# Patient Record
Sex: MALE | Race: WHITE | HISPANIC OR LATINO | URBAN - METROPOLITAN AREA
[De-identification: names, ages, dates, MRNs, and addresses within clinical notes are randomized per-mention and may not be internally consistent; named-entity substitution may affect disease eponyms.]

---

## 2017-10-05 ENCOUNTER — NEW PATIENT (OUTPATIENT)
Dept: URBAN - METROPOLITAN AREA CLINIC 10 | Facility: CLINIC | Age: 52
End: 2017-10-05
Payer: COMMERCIAL

## 2017-10-05 DIAGNOSIS — Z79.4 LONG TERM (CURRENT) USE OF INSULIN: ICD-10-CM

## 2017-10-05 PROCEDURE — 92004 COMPRE OPH EXAM NEW PT 1/>: CPT | Performed by: OPTOMETRIST

## 2017-10-05 PROCEDURE — 92250 FUNDUS PHOTOGRAPHY W/I&R: CPT | Performed by: OPTOMETRIST

## 2017-10-05 ASSESSMENT — INTRAOCULAR PRESSURE
OD: 14
OS: 13

## 2017-10-05 ASSESSMENT — VISUAL ACUITY
OS: 20/20
OD: 20/20

## 2017-10-05 ASSESSMENT — KERATOMETRY
OS: 45.13
OD: 45.25

## 2019-01-10 ENCOUNTER — FOLLOW UP ESTABLISHED (OUTPATIENT)
Dept: URBAN - METROPOLITAN AREA CLINIC 10 | Facility: CLINIC | Age: 54
End: 2019-01-10
Payer: COMMERCIAL

## 2019-01-10 PROCEDURE — 92014 COMPRE OPH EXAM EST PT 1/>: CPT | Performed by: OPTOMETRIST

## 2019-01-10 ASSESSMENT — VISUAL ACUITY
OS: 20/20
OD: 20/20

## 2019-01-10 ASSESSMENT — KERATOMETRY
OS: 45.13
OD: 45.25

## 2019-01-10 ASSESSMENT — INTRAOCULAR PRESSURE
OD: 17
OS: 15

## 2019-03-20 ENCOUNTER — OFFICE VISIT (OUTPATIENT)
Dept: FAMILY MEDICINE CLINIC | Facility: CLINIC | Age: 54
End: 2019-03-20
Payer: COMMERCIAL

## 2019-03-20 ENCOUNTER — LAB ENCOUNTER (OUTPATIENT)
Dept: LAB | Age: 54
End: 2019-03-20
Attending: FAMILY MEDICINE
Payer: COMMERCIAL

## 2019-03-20 VITALS
WEIGHT: 207.38 LBS | HEIGHT: 66.1 IN | BODY MASS INDEX: 33.33 KG/M2 | DIASTOLIC BLOOD PRESSURE: 91 MMHG | TEMPERATURE: 98 F | HEART RATE: 91 BPM | SYSTOLIC BLOOD PRESSURE: 158 MMHG

## 2019-03-20 DIAGNOSIS — Z00.00 PHYSICAL EXAM: ICD-10-CM

## 2019-03-20 DIAGNOSIS — H53.8 BLURRED VISION: ICD-10-CM

## 2019-03-20 DIAGNOSIS — Z12.11 COLON CANCER SCREENING: ICD-10-CM

## 2019-03-20 DIAGNOSIS — Z00.00 PHYSICAL EXAM: Primary | ICD-10-CM

## 2019-03-20 DIAGNOSIS — H11.003 PTERYGIUM OF BOTH EYES: ICD-10-CM

## 2019-03-20 DIAGNOSIS — K42.9 UMBILICAL HERNIA WITHOUT OBSTRUCTION AND WITHOUT GANGRENE: ICD-10-CM

## 2019-03-20 DIAGNOSIS — E66.09 CLASS 1 OBESITY DUE TO EXCESS CALORIES WITHOUT SERIOUS COMORBIDITY WITH BODY MASS INDEX (BMI) OF 33.0 TO 33.9 IN ADULT: ICD-10-CM

## 2019-03-20 LAB
ALBUMIN SERPL-MCNC: 4 G/DL (ref 3.4–5)
ALBUMIN/GLOB SERPL: 1.2 {RATIO} (ref 1–2)
ALP LIVER SERPL-CCNC: 73 U/L (ref 45–117)
ALT SERPL-CCNC: 50 U/L (ref 16–61)
ANION GAP SERPL CALC-SCNC: 5 MMOL/L (ref 0–18)
AST SERPL-CCNC: 23 U/L (ref 15–37)
BASOPHILS # BLD AUTO: 0.05 X10(3) UL (ref 0–0.2)
BASOPHILS NFR BLD AUTO: 0.6 %
BILIRUB SERPL-MCNC: 0.3 MG/DL (ref 0.1–2)
BILIRUB UR QL: NEGATIVE
BUN BLD-MCNC: 16 MG/DL (ref 7–18)
BUN/CREAT SERPL: 19 (ref 10–20)
CALCIUM BLD-MCNC: 9.2 MG/DL (ref 8.5–10.1)
CHLORIDE SERPL-SCNC: 106 MMOL/L (ref 98–107)
CHOLEST SMN-MCNC: 184 MG/DL (ref ?–200)
CLARITY UR: CLEAR
CO2 SERPL-SCNC: 29 MMOL/L (ref 21–32)
COLOR UR: YELLOW
CREAT BLD-MCNC: 0.84 MG/DL (ref 0.7–1.3)
DEPRECATED RDW RBC AUTO: 43.8 FL (ref 35.1–46.3)
EOSINOPHIL # BLD AUTO: 0.15 X10(3) UL (ref 0–0.7)
EOSINOPHIL NFR BLD AUTO: 1.7 %
ERYTHROCYTE [DISTWIDTH] IN BLOOD BY AUTOMATED COUNT: 13.7 % (ref 11–15)
EST. AVERAGE GLUCOSE BLD GHB EST-MCNC: 117 MG/DL (ref 68–126)
GLOBULIN PLAS-MCNC: 3.4 G/DL (ref 2.8–4.4)
GLUCOSE BLD-MCNC: 107 MG/DL (ref 70–99)
GLUCOSE UR-MCNC: NEGATIVE MG/DL
HBA1C MFR BLD HPLC: 5.7 % (ref ?–5.7)
HCT VFR BLD AUTO: 51.3 % (ref 39–53)
HDLC SERPL-MCNC: 34 MG/DL (ref 40–59)
HGB BLD-MCNC: 17.1 G/DL (ref 13–17.5)
HGB UR QL STRIP.AUTO: NEGATIVE
IMM GRANULOCYTES # BLD AUTO: 0.06 X10(3) UL (ref 0–1)
IMM GRANULOCYTES NFR BLD: 0.7 %
KETONES UR-MCNC: NEGATIVE MG/DL
LDLC SERPL CALC-MCNC: 123 MG/DL (ref ?–100)
LYMPHOCYTES # BLD AUTO: 2.6 X10(3) UL (ref 1–4)
LYMPHOCYTES NFR BLD AUTO: 29.5 %
M PROTEIN MFR SERPL ELPH: 7.4 G/DL (ref 6.4–8.2)
MCH RBC QN AUTO: 29.3 PG (ref 26–34)
MCHC RBC AUTO-ENTMCNC: 33.3 G/DL (ref 31–37)
MCV RBC AUTO: 87.8 FL (ref 80–100)
MONOCYTES # BLD AUTO: 0.78 X10(3) UL (ref 0.1–1)
MONOCYTES NFR BLD AUTO: 8.8 %
NEUTROPHILS # BLD AUTO: 5.18 X10 (3) UL (ref 1.5–7.7)
NEUTROPHILS # BLD AUTO: 5.18 X10(3) UL (ref 1.5–7.7)
NEUTROPHILS NFR BLD AUTO: 58.7 %
NITRITE UR QL STRIP.AUTO: NEGATIVE
NONHDLC SERPL-MCNC: 150 MG/DL (ref ?–130)
OSMOLALITY SERPL CALC.SUM OF ELEC: 292 MOSM/KG (ref 275–295)
PH UR: 5 [PH] (ref 5–8)
PLATELET # BLD AUTO: 281 10(3)UL (ref 150–450)
POTASSIUM SERPL-SCNC: 3.7 MMOL/L (ref 3.5–5.1)
PROT UR-MCNC: NEGATIVE MG/DL
PSA SERPL-MCNC: 0.89 NG/ML (ref ?–4)
RBC # BLD AUTO: 5.84 X10(6)UL (ref 4.3–5.7)
RBC #/AREA URNS AUTO: 1 /HPF
RBC #/AREA URNS AUTO: 1 /HPF
SODIUM SERPL-SCNC: 140 MMOL/L (ref 136–145)
SP GR UR STRIP: 1.03 (ref 1–1.03)
TRIGL SERPL-MCNC: 135 MG/DL (ref 30–149)
TSI SER-ACNC: 3.28 MIU/ML (ref 0.36–3.74)
UROBILINOGEN UR STRIP-ACNC: <2
VIT C UR-MCNC: NEGATIVE MG/DL
VLDLC SERPL CALC-MCNC: 27 MG/DL (ref 0–30)
WBC # BLD AUTO: 8.8 X10(3) UL (ref 4–11)
WBC #/AREA URNS AUTO: 5 /HPF
WBC #/AREA URNS AUTO: 5 /HPF

## 2019-03-20 PROCEDURE — 99386 PREV VISIT NEW AGE 40-64: CPT | Performed by: FAMILY MEDICINE

## 2019-03-20 PROCEDURE — 93010 ELECTROCARDIOGRAM REPORT: CPT | Performed by: FAMILY MEDICINE

## 2019-03-20 PROCEDURE — 84443 ASSAY THYROID STIM HORMONE: CPT

## 2019-03-20 PROCEDURE — 36415 COLL VENOUS BLD VENIPUNCTURE: CPT

## 2019-03-20 PROCEDURE — 83036 HEMOGLOBIN GLYCOSYLATED A1C: CPT

## 2019-03-20 PROCEDURE — 80061 LIPID PANEL: CPT

## 2019-03-20 PROCEDURE — 81001 URINALYSIS AUTO W/SCOPE: CPT | Performed by: FAMILY MEDICINE

## 2019-03-20 PROCEDURE — 90471 IMMUNIZATION ADMIN: CPT | Performed by: FAMILY MEDICINE

## 2019-03-20 PROCEDURE — 90715 TDAP VACCINE 7 YRS/> IM: CPT | Performed by: FAMILY MEDICINE

## 2019-03-20 PROCEDURE — 84153 ASSAY OF PSA TOTAL: CPT | Performed by: FAMILY MEDICINE

## 2019-03-20 PROCEDURE — 80053 COMPREHEN METABOLIC PANEL: CPT

## 2019-03-20 PROCEDURE — 82306 VITAMIN D 25 HYDROXY: CPT | Performed by: FAMILY MEDICINE

## 2019-03-20 PROCEDURE — 81015 MICROSCOPIC EXAM OF URINE: CPT | Performed by: FAMILY MEDICINE

## 2019-03-20 PROCEDURE — 93005 ELECTROCARDIOGRAM TRACING: CPT

## 2019-03-20 PROCEDURE — 85025 COMPLETE CBC W/AUTO DIFF WBC: CPT

## 2019-03-20 NOTE — PROGRESS NOTES
3/20/2019  8:51 AM    Tonny Chan is a 62year old male. Chief complaint(s): Patient presents with:  New Patient  Physical    HPI:     Tonny Chan primary complaint is regarding CPE.      Tonny Chan is a 62year old male is here for routi pain, hearing loss, nosebleeds and tinnitus. Eyes: Positive for redness and visual disturbance. Negative for pain. Respiratory: Negative for apnea, cough, chest tightness, shortness of breath and wheezing.     Cardiovascular: Negative for chest pain, p heard.  Pulmonary/Chest:   Lungs clear to ascultation bilaterally. Abdominal: Soft. Normal appearance and bowel sounds are normal. He exhibits no distension and no mass. There is no splenomegaly or hepatomegaly. There is no tenderness.  A hernia is presen Routine [E]      Urine Microscopic w Reflex CULTURE      Vitamin D, 25-Hydroxy [E]      TETANUS, DIPHTHERIA TOXOIDS AND ACELLULAR PERTUSIS VACCINE (TDAP), >7 YEARS, IM USE     In-House; Urine dip. Test/Procedures done today include: EKG.     IMMUNIZATIONS: Referrals:  TETANUS, DIPHTHERIA TOXOIDS AND ACELLULAR PERTUSIS VACCINE (TDAP), >7 YEARS, IM USE  GASTRO - INTERNAL  OPHTHALMOLOGY - INTERNAL  EKG 12-LEAD         Niesha Del Castillo MD

## 2019-03-22 LAB — 25(OH)D3 SERPL-MCNC: 13.8 NG/ML (ref 30–100)

## 2019-03-22 RX ORDER — ERGOCALCIFEROL 1.25 MG/1
50000 CAPSULE ORAL WEEKLY
Qty: 12 CAPSULE | Refills: 4 | Status: SHIPPED | OUTPATIENT
Start: 2019-03-22 | End: 2019-04-21

## 2019-07-10 ENCOUNTER — OFFICE VISIT (OUTPATIENT)
Dept: GASTROENTEROLOGY | Facility: CLINIC | Age: 54
End: 2019-07-10
Payer: COMMERCIAL

## 2019-07-10 VITALS
BODY MASS INDEX: 34.72 KG/M2 | WEIGHT: 216 LBS | DIASTOLIC BLOOD PRESSURE: 88 MMHG | HEART RATE: 83 BPM | HEIGHT: 66 IN | SYSTOLIC BLOOD PRESSURE: 152 MMHG

## 2019-07-10 DIAGNOSIS — Z12.11 ENCOUNTER FOR SCREENING COLONOSCOPY: Primary | ICD-10-CM

## 2019-07-10 PROCEDURE — 99243 OFF/OP CNSLTJ NEW/EST LOW 30: CPT | Performed by: PHYSICIAN ASSISTANT

## 2019-07-10 RX ORDER — POLYETHYLENE GLYCOL 3350, SODIUM CHLORIDE, SODIUM BICARBONATE, POTASSIUM CHLORIDE 420; 11.2; 5.72; 1.48 G/4L; G/4L; G/4L; G/4L
POWDER, FOR SOLUTION ORAL
Qty: 1 BOTTLE | Refills: 0 | Status: SHIPPED | OUTPATIENT
Start: 2019-07-10 | End: 2019-10-07

## 2019-07-10 NOTE — H&P
[address] [facility] [address] Gastroenterology                                                                                                  Clinic History and Physical     Pa Smokeless tobacco: Never Used      Tobacco comment: exposure to cigarettes by roomate    Alcohol use:  Yes      Alcohol/week: 3.0 oz      Types: 5 Cans of beer per week      Frequency: 2-3 times a week      Drinks per session: 5 or 6    Drug use: No      Co use, former tobacco use, who presents for colon cancer screening evaluation. No prior colonoscopy. No anemia noted on most recent labs. Patient presents today without GI complaints and feels otherwise well.  Language Line (SK#[id number] Cole Acosta) utilzed during patient signed informed consent and elected to proceed with colonoscopy with intervention [i.e. polypectomy, stent placement, etc.] as indicated. Orders This Visit:  No orders of the defined types were placed in this encounter.       Meds This Visit:

## 2019-07-10 NOTE — PATIENT INSTRUCTIONS
1. Schedule colonoscopy with Dr. Tonia Rosenberg with MAC @ [address] [place] [address] or [facility]. 2.  bowel prep from pharmacy - I have prescribed Trilyte split dose preparation    3.  Medication Changes:    ** If MAC @ [facility]/NE:    - HOLD ACE/ARBs the night before

## 2019-07-23 ENCOUNTER — TELEPHONE (OUTPATIENT)
Dept: GASTROENTEROLOGY | Facility: CLINIC | Age: 54
End: 2019-07-23

## 2019-07-23 DIAGNOSIS — Z12.11 COLON CANCER SCREENING: Primary | ICD-10-CM

## 2019-07-23 NOTE — TELEPHONE ENCOUNTER
Damian Gramajo routed this conversation to [facility] Gi Clinical Staff         7/22/19 5:28 PM      Low Kline (Emergency Contact) contacted Earle Alaniz       7/22/19 5:28 PM   Note      Calling to schedule CLN

## 2019-07-23 NOTE — TELEPHONE ENCOUNTER
Routed to GI schedulers- please schedule per PB OV notes from 7/10/19, thank you.     Per PB:  \"Recommend:  -Schedule colonoscopy w/ Dr. Darlene Delgado or Yuan Osborn with MAC anesthesia @ [address] [place] [address] or [facility]   -Prep: Trilyte split dose preparation   -Medicatio

## 2019-08-20 NOTE — TELEPHONE ENCOUNTER
Scheduled for:  Colonoscopy 02609  Provider Name: Dr. Allie Ceja  Date:  9/25/19  Location:  [facility]  Sedation:  MAC  Time:   5511 (pt is aware to arrive at 1015)   Prep:  Trilyte, sent via Mychart  Meds/Allergies Reconciled?:  Physician reviewed   Diagnosis with c

## 2019-08-20 NOTE — TELEPHONE ENCOUNTER
Pt spouse called to get pt scheduled.  Attempt to transfer (10 rings) no answer please call [phone] thank you

## 2019-08-21 ENCOUNTER — TELEPHONE (OUTPATIENT)
Dept: GASTROENTEROLOGY | Facility: CLINIC | Age: 54
End: 2019-08-21

## 2019-08-21 DIAGNOSIS — Z12.11 COLON CANCER SCREENING: Primary | ICD-10-CM

## 2019-08-21 NOTE — TELEPHONE ENCOUNTER
Rescheduled for:  Colonoscopy [address]1081  Provider Name: Dr. Carlos Alfaro  Date:    From-9/25/19  To-10/8/19  Location:  [facility]  Sedation:  MAC  Time:    From-1115   GB-[id number] (pt is aware to arrive at 1345)   Prep:  Trilyte, sent new instructions via [email or web address].mes/Allergi

## 2019-10-07 ENCOUNTER — TELEPHONE (OUTPATIENT)
Dept: GASTROENTEROLOGY | Facility: CLINIC | Age: 54
End: 2019-10-07

## 2019-10-07 RX ORDER — POLYETHYLENE GLYCOL 3350, SODIUM CHLORIDE, SODIUM BICARBONATE, POTASSIUM CHLORIDE 420; 11.2; 5.72; 1.48 G/4L; G/4L; G/4L; G/4L
POWDER, FOR SOLUTION ORAL
Qty: 1 BOTTLE | Refills: 0 | Status: ON HOLD | OUTPATIENT
Start: 2019-10-07 | End: 2019-10-08

## 2019-10-07 NOTE — TELEPHONE ENCOUNTER
Pt wife called and states she is at the pharmacy to  prep but pharmacy does not take her insurance n pt Is scheduled for tomorrow and would like to transfer to different pharmacy [facility] in [address] .  Please call

## 2019-10-07 NOTE — TELEPHONE ENCOUNTER
Spoke to pt wife, Angel Orourke ([facility] per [employer]) and reviewed that [facility] is supposed to call the [facility] and transfer the RX on their behalf. She states she already tried that but is still having trouble w/ [facility].  I re-sent RX to correct location and reviewed

## 2019-10-08 ENCOUNTER — ANESTHESIA EVENT (OUTPATIENT)
Dept: ENDOSCOPY | Facility: HOSPITAL | Age: 54
End: 2019-10-08
Payer: COMMERCIAL

## 2019-10-08 ENCOUNTER — ANESTHESIA (OUTPATIENT)
Dept: ENDOSCOPY | Facility: HOSPITAL | Age: 54
End: 2019-10-08
Payer: COMMERCIAL

## 2019-10-08 ENCOUNTER — HOSPITAL ENCOUNTER (OUTPATIENT)
Facility: HOSPITAL | Age: 54
Setting detail: HOSPITAL OUTPATIENT SURGERY
Discharge: HOME | End: 2019-10-08
Attending: INTERNAL MEDICINE | Admitting: INTERNAL MEDICINE
Payer: COMMERCIAL

## 2019-10-08 DIAGNOSIS — Z12.11 COLON CANCER SCREENING: ICD-10-CM

## 2019-10-08 PROCEDURE — 0DBN8ZX EXCISION OF SIGMOID COLON, VIA NATURAL OR ARTIFICIAL OPENING ENDOSCOPIC, DIAGNOSTIC: ICD-10-PCS | Performed by: NURSE ANESTHETIST, CERTIFIED REGISTERED

## 2019-10-08 PROCEDURE — 0DBK8ZX EXCISION OF ASCENDING COLON, VIA NATURAL OR ARTIFICIAL OPENING ENDOSCOPIC, DIAGNOSTIC: ICD-10-PCS | Performed by: NURSE ANESTHETIST, CERTIFIED REGISTERED

## 2019-10-08 PROCEDURE — 45385 COLONOSCOPY W/LESION REMOVAL: CPT | Performed by: INTERNAL MEDICINE

## 2019-10-08 RX ORDER — LIDOCAINE HYDROCHLORIDE 10 MG/ML
INJECTION, SOLUTION EPIDURAL; INFILTRATION; INTRACAUDAL; PERINEURAL AS NEEDED
Status: DISCONTINUED | OUTPATIENT
Start: 2019-10-08 | End: 2019-10-08 | Stop reason: SURG

## 2019-10-08 RX ORDER — SODIUM CHLORIDE, SODIUM LACTATE, POTASSIUM CHLORIDE, CALCIUM CHLORIDE 600; 310; 30; 20 MG/100ML; MG/100ML; MG/100ML; MG/100ML
INJECTION, SOLUTION INTRAVENOUS CONTINUOUS
Status: DISCONTINUED | OUTPATIENT
Start: 2019-10-08 | End: 2019-10-08

## 2019-10-08 RX ADMIN — SODIUM CHLORIDE, SODIUM LACTATE, POTASSIUM CHLORIDE, CALCIUM CHLORIDE: 600; 310; 30; 20 INJECTION, SOLUTION INTRAVENOUS at 15:22:00

## 2019-10-08 RX ADMIN — LIDOCAINE HYDROCHLORIDE 50 MG: 10 INJECTION, SOLUTION EPIDURAL; INFILTRATION; INTRACAUDAL; PERINEURAL at 14:50:00

## 2019-10-08 NOTE — OPERATIVE REPORT
COLONOSCOPY REPORT    Juan Pacheco     1965 Age 62year old   PCP Trina Lira MD Endoscopist Juan Carlos Cook MD     Date of procedure: 10/08/19    Procedure: Colonoscopy w/cold snare polypectomy    Pre-operative diagnosis: Screening polypectomy and retrieved. Persistent oozing from polypectomy site controlled with a single endoclip placement. 2. Diverticulosis: none.     3. Terminal ileum: the visualized mucosa appeared normal.    4. A retroflexed view of the rectum revealed small

## 2019-10-08 NOTE — ANESTHESIA POSTPROCEDURE EVALUATION
Patient: Shavon Preciado    Procedure Summary     Date:  10/08/19 Room / Location:  [address] [place] [address] ENDOSCOPY 01 / [address] [place] [address] ENDOSCOPY    Anesthesia Start:  1658 Anesthesia Stop:  4682    Procedure:  COLONOSCOPY (N/A ) Diagnosis:       Colon cancer screening      ([email or web address]

## 2019-10-08 NOTE — H&P
History & Physical Examination    Patient Name: Jonas Ocampo  MRN: [id number]  [facility]: [id number]  YOB: 1965    Diagnosis: screening for colon cancer      Medications Prior to Admission:  [DISCONTINUED] PEG 3350-KCl-Na Bicarb-NaCl (Macrina Naylor) above.    [facility] [address] [place] [address] - Gastroenterology  10/8/2019  3:17 PM

## 2019-10-08 NOTE — ANESTHESIA PREPROCEDURE EVALUATION
Anesthesia PreOp Note    HPI:     Juan Pacheco is a 62year old male who presents for preoperative consultation requested by: Colonel Rhea MD    Date of Surgery: 10/8/2019    Procedure(s):  COLONOSCOPY  Indication: Colon cancer screening    Gricelda Quiros Tobacco comment: exposure to cigarettes by roomate    Substance and Sexual Activity      Alcohol use:  Yes        Alcohol/week: 5.0 standard drinks        Types: 5 Cans of beer per week        Frequency: 2-3 times a week        Drinks per session: 5 or (+) obese,                Anesthesia Plan:   ASA:  2  Plan:   MAC  Post-op Pain Management: IV analgesics  Informed Consent Plan and Risks Discussed With:  Patient and significant other  Discussed plan with:  Surgeon      I have informed Christian hartley

## 2019-10-09 VITALS
WEIGHT: 209 LBS | DIASTOLIC BLOOD PRESSURE: 80 MMHG | SYSTOLIC BLOOD PRESSURE: 125 MMHG | RESPIRATION RATE: 16 BRPM | HEART RATE: 81 BPM | HEIGHT: 66 IN | BODY MASS INDEX: 33.59 KG/M2 | OXYGEN SATURATION: 97 %

## 2019-10-23 ENCOUNTER — TELEPHONE (OUTPATIENT)
Dept: GASTROENTEROLOGY | Facility: CLINIC | Age: 54
End: 2019-10-23

## 2019-10-23 NOTE — TELEPHONE ENCOUNTER
I mailed out colonoscopy results letter to pt  Updated health maintenance  Entered into 3 yr CLN recall  Recall colon in 3 years per.  Colon done 10/08/19    GI staff: please place recall for colonoscopy in 3 years

## 2020-03-13 ENCOUNTER — FOLLOW UP ESTABLISHED (OUTPATIENT)
Dept: URBAN - METROPOLITAN AREA CLINIC 10 | Facility: CLINIC | Age: 55
End: 2020-03-13
Payer: COMMERCIAL

## 2020-03-13 DIAGNOSIS — E11.9 TYPE 2 DIABETES MELLITUS WITHOUT COMPLICATIONS: Primary | ICD-10-CM

## 2020-03-13 DIAGNOSIS — Z79.84 LONG TERM (CURRENT) USE OF ORAL ANTIDIABETIC DRUGS: ICD-10-CM

## 2020-03-13 PROCEDURE — 92014 COMPRE OPH EXAM EST PT 1/>: CPT | Performed by: OPTOMETRIST

## 2020-03-13 PROCEDURE — 92134 CPTRZ OPH DX IMG PST SGM RTA: CPT | Performed by: OPTOMETRIST

## 2020-03-13 ASSESSMENT — INTRAOCULAR PRESSURE
OS: 15
OD: 14

## 2020-03-13 ASSESSMENT — VISUAL ACUITY
OD: 20/20
OS: 20/25

## 2020-03-13 ASSESSMENT — KERATOMETRY
OS: 45.00
OD: 45.01

## 2021-02-17 ENCOUNTER — LAB ENCOUNTER (OUTPATIENT)
Dept: LAB | Age: 56
End: 2021-02-17
Attending: FAMILY MEDICINE
Payer: COMMERCIAL

## 2021-02-17 ENCOUNTER — OFFICE VISIT (OUTPATIENT)
Dept: FAMILY MEDICINE CLINIC | Facility: CLINIC | Age: 56
End: 2021-02-17
Payer: COMMERCIAL

## 2021-02-17 VITALS
DIASTOLIC BLOOD PRESSURE: 102 MMHG | SYSTOLIC BLOOD PRESSURE: 175 MMHG | TEMPERATURE: 98 F | HEART RATE: 80 BPM | BODY MASS INDEX: 33.88 KG/M2 | HEIGHT: 66 IN | WEIGHT: 210.81 LBS

## 2021-02-17 DIAGNOSIS — I10 ESSENTIAL HYPERTENSION: ICD-10-CM

## 2021-02-17 DIAGNOSIS — E66.09 CLASS 1 OBESITY DUE TO EXCESS CALORIES WITH SERIOUS COMORBIDITY AND BODY MASS INDEX (BMI) OF 34.0 TO 34.9 IN ADULT: ICD-10-CM

## 2021-02-17 DIAGNOSIS — L30.9 ECZEMA OF BOTH HANDS: ICD-10-CM

## 2021-02-17 DIAGNOSIS — Z00.00 PHYSICAL EXAM: ICD-10-CM

## 2021-02-17 DIAGNOSIS — Z00.00 PHYSICAL EXAM: Primary | ICD-10-CM

## 2021-02-17 LAB
ALBUMIN SERPL-MCNC: 4 G/DL (ref 3.4–5)
ALBUMIN/GLOB SERPL: 1.1 {RATIO} (ref 1–2)
ALP LIVER SERPL-CCNC: 81 U/L
ALT SERPL-CCNC: 54 U/L
ANION GAP SERPL CALC-SCNC: 3 MMOL/L (ref 0–18)
AST SERPL-CCNC: 24 U/L (ref 15–37)
BACTERIA UR QL AUTO: NEGATIVE /HPF
BASOPHILS # BLD AUTO: 0.05 X10(3) UL (ref 0–0.2)
BASOPHILS NFR BLD AUTO: 0.6 %
BILIRUB SERPL-MCNC: 0.6 MG/DL (ref 0.1–2)
BILIRUB UR QL: NEGATIVE
BUN BLD-MCNC: 17 MG/DL (ref 7–18)
BUN/CREAT SERPL: 18.1 (ref 10–20)
CALCIUM BLD-MCNC: 9.1 MG/DL (ref 8.5–10.1)
CHLORIDE SERPL-SCNC: 107 MMOL/L (ref 98–112)
CHOLEST SMN-MCNC: 176 MG/DL (ref ?–200)
CLARITY UR: CLEAR
CO2 SERPL-SCNC: 34 MMOL/L (ref 21–32)
COLOR UR: YELLOW
CREAT BLD-MCNC: 0.94 MG/DL
DEPRECATED RDW RBC AUTO: 43.3 FL (ref 35.1–46.3)
EOSINOPHIL # BLD AUTO: 0.13 X10(3) UL (ref 0–0.7)
EOSINOPHIL NFR BLD AUTO: 1.5 %
ERYTHROCYTE [DISTWIDTH] IN BLOOD BY AUTOMATED COUNT: 13.4 % (ref 11–15)
EST. AVERAGE GLUCOSE BLD GHB EST-MCNC: 114 MG/DL (ref 68–126)
GLOBULIN PLAS-MCNC: 3.6 G/DL (ref 2.8–4.4)
GLUCOSE BLD-MCNC: 122 MG/DL (ref 70–99)
GLUCOSE UR-MCNC: NEGATIVE MG/DL
HBA1C MFR BLD HPLC: 5.6 % (ref ?–5.7)
HCT VFR BLD AUTO: 50.6 %
HDLC SERPL-MCNC: 40 MG/DL (ref 40–59)
HGB BLD-MCNC: 16.8 G/DL
HGB UR QL STRIP.AUTO: NEGATIVE
IMM GRANULOCYTES # BLD AUTO: 0.03 X10(3) UL (ref 0–1)
IMM GRANULOCYTES NFR BLD: 0.3 %
KETONES UR-MCNC: NEGATIVE MG/DL
LDLC SERPL CALC-MCNC: 114 MG/DL (ref ?–100)
LEUKOCYTE ESTERASE UR QL STRIP.AUTO: NEGATIVE
LYMPHOCYTES # BLD AUTO: 2.53 X10(3) UL (ref 1–4)
LYMPHOCYTES NFR BLD AUTO: 29.1 %
M PROTEIN MFR SERPL ELPH: 7.6 G/DL (ref 6.4–8.2)
MCH RBC QN AUTO: 29.4 PG (ref 26–34)
MCHC RBC AUTO-ENTMCNC: 33.2 G/DL (ref 31–37)
MCV RBC AUTO: 88.5 FL
MONOCYTES # BLD AUTO: 0.72 X10(3) UL (ref 0.1–1)
MONOCYTES NFR BLD AUTO: 8.3 %
NEUTROPHILS # BLD AUTO: 5.24 X10 (3) UL (ref 1.5–7.7)
NEUTROPHILS # BLD AUTO: 5.24 X10(3) UL (ref 1.5–7.7)
NEUTROPHILS NFR BLD AUTO: 60.2 %
NITRITE UR QL STRIP.AUTO: NEGATIVE
NONHDLC SERPL-MCNC: 136 MG/DL (ref ?–130)
OSMOLALITY SERPL CALC.SUM OF ELEC: 301 MOSM/KG (ref 275–295)
PATIENT FASTING Y/N/NP: YES
PATIENT FASTING Y/N/NP: YES
PH UR: 5 [PH] (ref 5–8)
PLATELET # BLD AUTO: 277 10(3)UL (ref 150–450)
POTASSIUM SERPL-SCNC: 4.3 MMOL/L (ref 3.5–5.1)
PROT UR-MCNC: NEGATIVE MG/DL
PSA SERPL-MCNC: 0.99 NG/ML (ref ?–4)
RBC # BLD AUTO: 5.72 X10(6)UL
RBC #/AREA URNS AUTO: 0 /HPF
SODIUM SERPL-SCNC: 144 MMOL/L (ref 136–145)
SP GR UR STRIP: 1.03 (ref 1–1.03)
TRIGL SERPL-MCNC: 109 MG/DL (ref 30–149)
TSI SER-ACNC: 2.96 MIU/ML (ref 0.36–3.74)
UROBILINOGEN UR STRIP-ACNC: <2
VLDLC SERPL CALC-MCNC: 22 MG/DL (ref 0–30)
WBC # BLD AUTO: 8.7 X10(3) UL (ref 4–11)
WBC #/AREA URNS AUTO: <1 /HPF

## 2021-02-17 PROCEDURE — 82306 VITAMIN D 25 HYDROXY: CPT | Performed by: FAMILY MEDICINE

## 2021-02-17 PROCEDURE — 84443 ASSAY THYROID STIM HORMONE: CPT

## 2021-02-17 PROCEDURE — 84153 ASSAY OF PSA TOTAL: CPT | Performed by: FAMILY MEDICINE

## 2021-02-17 PROCEDURE — 36415 COLL VENOUS BLD VENIPUNCTURE: CPT | Performed by: FAMILY MEDICINE

## 2021-02-17 PROCEDURE — 80061 LIPID PANEL: CPT

## 2021-02-17 PROCEDURE — 85025 COMPLETE CBC W/AUTO DIFF WBC: CPT

## 2021-02-17 PROCEDURE — 3008F BODY MASS INDEX DOCD: CPT | Performed by: FAMILY MEDICINE

## 2021-02-17 PROCEDURE — 80053 COMPREHEN METABOLIC PANEL: CPT

## 2021-02-17 PROCEDURE — 3077F SYST BP >= 140 MM HG: CPT | Performed by: FAMILY MEDICINE

## 2021-02-17 PROCEDURE — 83036 HEMOGLOBIN GLYCOSYLATED A1C: CPT

## 2021-02-17 PROCEDURE — 99396 PREV VISIT EST AGE 40-64: CPT | Performed by: FAMILY MEDICINE

## 2021-02-17 PROCEDURE — 81015 MICROSCOPIC EXAM OF URINE: CPT | Performed by: FAMILY MEDICINE

## 2021-02-17 PROCEDURE — 81001 URINALYSIS AUTO W/SCOPE: CPT | Performed by: FAMILY MEDICINE

## 2021-02-17 PROCEDURE — 3080F DIAST BP >= 90 MM HG: CPT | Performed by: FAMILY MEDICINE

## 2021-02-17 PROCEDURE — 99213 OFFICE O/P EST LOW 20 MIN: CPT | Performed by: FAMILY MEDICINE

## 2021-02-17 RX ORDER — LISINOPRIL 20 MG/1
20 TABLET ORAL DAILY
Qty: 30 TABLET | Refills: 2 | Status: SHIPPED | OUTPATIENT
Start: 2021-02-17 | End: 2021-03-10

## 2021-02-17 NOTE — PROGRESS NOTES
2/17/2021  11:59 AM    Kimber Merritt is a 60year old male. Chief complaint(s): Patient presents with:  Physical    HPI:     Kimber Merritt primary complaint is regarding CPE.        Kimber Merritt is a 60year old male is here for routine periodi Take 1 tablet (20 mg total) by mouth daily. 30 tablet 2   • triamcinolone acetonide 0.1 % External Cream Apply topically 2 (two) times daily as needed.  60 g 1       Allergies:  No Known Allergies      ROS:   Review of Systems   Constitutional: Positive for appearance and bowel sounds are normal. He exhibits no distension and no mass. There is no splenomegaly or hepatomegaly. There is no abdominal tenderness.  Hernia confirmed negative in the ventral area, confirmed negative in the right inguinal area and conf helmets, sunscreen, protective sports gear ), nutrition (i.e. healthy meals and snacks (i.e. avoid junk food and high-carbohydrate foods); athletic conditioning, fluids; low fat milk, limit to less than 20 oz. a day; dental care ), and Healthy habits& Soci follow-up visit in 3 weeks. 4. Eczema of both hands    MEDICATIONS:     • triamcinolone acetonide 0.1 % External Cream 60 g 1     Sig: Apply topically 2 (two) times daily as needed.        RECOMMENDATIONS given include: Patient was reassured of  his

## 2021-02-19 LAB — 25(OH)D3 SERPL-MCNC: 12.3 NG/ML (ref 30–100)

## 2021-02-21 RX ORDER — ERGOCALCIFEROL 1.25 MG/1
50000 CAPSULE ORAL WEEKLY
Qty: 12 CAPSULE | Refills: 4 | Status: SHIPPED | OUTPATIENT
Start: 2021-02-21 | End: 2021-03-23

## 2021-03-09 DIAGNOSIS — Z23 NEED FOR VACCINATION: ICD-10-CM

## 2021-03-10 ENCOUNTER — OFFICE VISIT (OUTPATIENT)
Dept: FAMILY MEDICINE CLINIC | Facility: CLINIC | Age: 56
End: 2021-03-10
Payer: COMMERCIAL

## 2021-03-10 VITALS
SYSTOLIC BLOOD PRESSURE: 133 MMHG | BODY MASS INDEX: 33.75 KG/M2 | WEIGHT: 210 LBS | HEIGHT: 66 IN | HEART RATE: 74 BPM | DIASTOLIC BLOOD PRESSURE: 77 MMHG

## 2021-03-10 DIAGNOSIS — I10 ESSENTIAL HYPERTENSION: Primary | ICD-10-CM

## 2021-03-10 DIAGNOSIS — E55.9 VITAMIN D DEFICIENCY: ICD-10-CM

## 2021-03-10 PROCEDURE — 3078F DIAST BP <80 MM HG: CPT | Performed by: FAMILY MEDICINE

## 2021-03-10 PROCEDURE — 99213 OFFICE O/P EST LOW 20 MIN: CPT | Performed by: FAMILY MEDICINE

## 2021-03-10 PROCEDURE — 3075F SYST BP GE 130 - 139MM HG: CPT | Performed by: FAMILY MEDICINE

## 2021-03-10 PROCEDURE — 3008F BODY MASS INDEX DOCD: CPT | Performed by: FAMILY MEDICINE

## 2021-03-10 RX ORDER — LISINOPRIL 20 MG/1
20 TABLET ORAL DAILY
Qty: 90 TABLET | Refills: 2 | Status: SHIPPED | OUTPATIENT
Start: 2021-03-10

## 2021-03-10 NOTE — PROGRESS NOTES
3/10/2021  12:39 PM    Radnell Villalobos is a 60year old male. Chief complaint(s): Patient presents with: Follow - Up: discuss lab results     HPI:     Randell Villalobos primary complaint is regarding HTN.      Linda Carranza a 60year old male prese tablet (20 mg total) by mouth daily. 90 tablet 2   • ergocalciferol 1.25 MG (51248 UT) Oral Cap Take 1 capsule (50,000 Units total) by mouth once a week.  12 capsule 4   • triamcinolone acetonide 0.1 % External Cream Apply topically 2 (two) times daily as n AST 24 15 - 37 U/L    Alkaline Phosphatase 81 45 - 117 U/L    Bilirubin, Total 0.6 0.1 - 2.0 mg/dL    Total Protein 7.6 6.4 - 8.2 g/dL    Albumin 4.0 3.4 - 5.0 g/dL    Globulin  3.6 2.8 - 4.4 g/dL    A/G Ratio 1.1 1.0 - 2.0    FASTING Yes    HEMOGLOBIN A1C Lymphocyte % 29.1 %    Monocyte % 8.3 %    Eosinophil % 1.5 %    Basophil % 0.6 %    Immature Granulocyte % 0.3 %     EKG / Spirometry : -     Radiology: No results found.      ASSESSMENT/PLAN:   Assessment   Essential hypertension  (primary encounter diagn

## 2021-03-16 ENCOUNTER — IMMUNIZATION (OUTPATIENT)
Dept: LAB | Age: 56
End: 2021-03-16
Attending: HOSPITALIST
Payer: COMMERCIAL

## 2021-03-16 DIAGNOSIS — Z23 NEED FOR VACCINATION: Primary | ICD-10-CM

## 2021-03-16 PROCEDURE — 0001A SARSCOV2 VAC 30MCG/0.3ML IM: CPT

## 2021-04-06 ENCOUNTER — IMMUNIZATION (OUTPATIENT)
Dept: LAB | Age: 56
End: 2021-04-06
Attending: HOSPITALIST
Payer: COMMERCIAL

## 2021-04-06 DIAGNOSIS — Z23 NEED FOR VACCINATION: Primary | ICD-10-CM

## 2021-04-06 PROCEDURE — 0002A SARSCOV2 VAC 30MCG/0.3ML IM: CPT

## 2022-02-19 RX ORDER — LISINOPRIL 20 MG/1
20 TABLET ORAL DAILY
Qty: 10 TABLET | Refills: 0 | Status: SHIPPED | OUTPATIENT
Start: 2022-02-19 | End: 2022-03-30

## 2022-02-19 NOTE — TELEPHONE ENCOUNTER
Please review; protocol failed/no protocol.   Requested Prescriptions   Pending Prescriptions Disp Refills    LISINOPRIL 20 MG Oral Tab [Pharmacy Med Name: LISINOPRIL 20MG TABLETS] 90 tablet 2     Sig: TAKE 1 TABLET(20 MG) BY MOUTH DAILY        Hypertensive Medications Protocol Failed - 2/18/2022  7:47 PM        Failed - CMP or BMP in past 12 months        Failed - Appointment in past 6 or next 3 months        Passed - GFR Non- > 50     Lab Results   Component Value Date    GFRNAA 89 02/17/2021                     Recent Outpatient Visits              11 months ago Essential hypertension    [facility] Paramjit Ochoa MD    Office Visit    1 year ago Physical exam    Antonio Barney MD    Office Visit    2 years ago Encounter for screening colonoscopy    Jiles January, [address], [place]    Office Visit    2 years ago Physical exam    Antonio Barney MD    Office Visit

## 2022-03-30 ENCOUNTER — OFFICE VISIT (OUTPATIENT)
Dept: FAMILY MEDICINE CLINIC | Facility: CLINIC | Age: 57
End: 2022-03-30
Payer: COMMERCIAL

## 2022-03-30 VITALS
BODY MASS INDEX: 33.62 KG/M2 | HEART RATE: 105 BPM | SYSTOLIC BLOOD PRESSURE: 139 MMHG | WEIGHT: 209.19 LBS | DIASTOLIC BLOOD PRESSURE: 80 MMHG | HEIGHT: 66 IN

## 2022-03-30 DIAGNOSIS — I10 ESSENTIAL HYPERTENSION: ICD-10-CM

## 2022-03-30 DIAGNOSIS — Z00.00 PHYSICAL EXAM: Primary | ICD-10-CM

## 2022-03-30 PROCEDURE — 99396 PREV VISIT EST AGE 40-64: CPT | Performed by: FAMILY MEDICINE

## 2022-03-30 PROCEDURE — 3079F DIAST BP 80-89 MM HG: CPT | Performed by: FAMILY MEDICINE

## 2022-03-30 PROCEDURE — 3008F BODY MASS INDEX DOCD: CPT | Performed by: FAMILY MEDICINE

## 2022-03-30 PROCEDURE — 3075F SYST BP GE 130 - 139MM HG: CPT | Performed by: FAMILY MEDICINE

## 2022-03-30 PROCEDURE — 99213 OFFICE O/P EST LOW 20 MIN: CPT | Performed by: FAMILY MEDICINE

## 2022-03-30 RX ORDER — LISINOPRIL 20 MG/1
20 TABLET ORAL DAILY
Qty: 90 TABLET | Refills: 2 | Status: SHIPPED | OUTPATIENT
Start: 2022-03-30

## 2022-04-30 ENCOUNTER — LAB ENCOUNTER (OUTPATIENT)
Dept: LAB | Facility: HOSPITAL | Age: 57
End: 2022-04-30
Attending: FAMILY MEDICINE
Payer: COMMERCIAL

## 2022-04-30 DIAGNOSIS — I10 ESSENTIAL HYPERTENSION: ICD-10-CM

## 2022-04-30 DIAGNOSIS — Z00.00 PHYSICAL EXAM: ICD-10-CM

## 2022-04-30 LAB
ALBUMIN SERPL-MCNC: 3.5 G/DL (ref 3.4–5)
ALBUMIN/GLOB SERPL: 1 {RATIO} (ref 1–2)
ALP LIVER SERPL-CCNC: 69 U/L
ALT SERPL-CCNC: 37 U/L
ANION GAP SERPL CALC-SCNC: 3 MMOL/L (ref 0–18)
AST SERPL-CCNC: 14 U/L (ref 15–37)
BASOPHILS # BLD AUTO: 0.03 X10(3) UL (ref 0–0.2)
BASOPHILS NFR BLD AUTO: 0.3 %
BILIRUB SERPL-MCNC: 0.5 MG/DL (ref 0.1–2)
BILIRUB UR QL: NEGATIVE
BUN BLD-MCNC: 20 MG/DL (ref 7–18)
BUN/CREAT SERPL: 21.5 (ref 10–20)
CALCIUM BLD-MCNC: 8.7 MG/DL (ref 8.5–10.1)
CHLORIDE SERPL-SCNC: 107 MMOL/L (ref 98–112)
CHOLEST SERPL-MCNC: 156 MG/DL (ref ?–200)
CO2 SERPL-SCNC: 33 MMOL/L (ref 21–32)
COLOR UR: YELLOW
CREAT BLD-MCNC: 0.93 MG/DL
DEPRECATED RDW RBC AUTO: 44.4 FL (ref 35.1–46.3)
EOSINOPHIL # BLD AUTO: 0.19 X10(3) UL (ref 0–0.7)
EOSINOPHIL NFR BLD AUTO: 2 %
ERYTHROCYTE [DISTWIDTH] IN BLOOD BY AUTOMATED COUNT: 13.3 % (ref 11–15)
EST. AVERAGE GLUCOSE BLD GHB EST-MCNC: 117 MG/DL (ref 68–126)
FASTING PATIENT LIPID ANSWER: YES
FASTING STATUS PATIENT QL REPORTED: YES
GLOBULIN PLAS-MCNC: 3.6 G/DL (ref 2.8–4.4)
GLUCOSE BLD-MCNC: 109 MG/DL (ref 70–99)
GLUCOSE UR-MCNC: NEGATIVE MG/DL
HBA1C MFR BLD: 5.7 % (ref ?–5.7)
HCT VFR BLD AUTO: 49.3 %
HDLC SERPL-MCNC: 36 MG/DL (ref 40–59)
HGB BLD-MCNC: 16.3 G/DL
HGB UR QL STRIP.AUTO: NEGATIVE
HYALINE CASTS #/AREA URNS AUTO: PRESENT /LPF
IMM GRANULOCYTES # BLD AUTO: 0.05 X10(3) UL (ref 0–1)
IMM GRANULOCYTES NFR BLD: 0.5 %
KETONES UR-MCNC: NEGATIVE MG/DL
LDLC SERPL CALC-MCNC: 100 MG/DL (ref ?–100)
LYMPHOCYTES # BLD AUTO: 2.76 X10(3) UL (ref 1–4)
LYMPHOCYTES NFR BLD AUTO: 28.5 %
MCH RBC QN AUTO: 29.9 PG (ref 26–34)
MCHC RBC AUTO-ENTMCNC: 33.1 G/DL (ref 31–37)
MCV RBC AUTO: 90.3 FL
MONOCYTES # BLD AUTO: 0.65 X10(3) UL (ref 0.1–1)
MONOCYTES NFR BLD AUTO: 6.7 %
NEUTROPHILS # BLD AUTO: 6 X10 (3) UL (ref 1.5–7.7)
NEUTROPHILS # BLD AUTO: 6 X10(3) UL (ref 1.5–7.7)
NEUTROPHILS NFR BLD AUTO: 62 %
NITRITE UR QL STRIP.AUTO: NEGATIVE
NONHDLC SERPL-MCNC: 120 MG/DL (ref ?–130)
OSMOLALITY SERPL CALC.SUM OF ELEC: 299 MOSM/KG (ref 275–295)
PH UR: 5 [PH] (ref 5–8)
PLATELET # BLD AUTO: 237 10(3)UL (ref 150–450)
POTASSIUM SERPL-SCNC: 4.2 MMOL/L (ref 3.5–5.1)
PROT SERPL-MCNC: 7.1 G/DL (ref 6.4–8.2)
PROT UR-MCNC: 30 MG/DL
PSA SERPL-MCNC: 1.14 NG/ML (ref ?–4)
RBC # BLD AUTO: 5.46 X10(6)UL
SODIUM SERPL-SCNC: 143 MMOL/L (ref 136–145)
SP GR UR STRIP: >1.03 (ref 1–1.03)
TRIGL SERPL-MCNC: 109 MG/DL (ref 30–149)
TSI SER-ACNC: 2.15 MIU/ML (ref 0.36–3.74)
UROBILINOGEN UR STRIP-ACNC: <2
VIT C UR-MCNC: 40 MG/DL
VIT D+METAB SERPL-MCNC: 24.1 NG/ML (ref 30–100)
VLDLC SERPL CALC-MCNC: 18 MG/DL (ref 0–30)
WBC # BLD AUTO: 9.7 X10(3) UL (ref 4–11)

## 2022-04-30 PROCEDURE — 83036 HEMOGLOBIN GLYCOSYLATED A1C: CPT

## 2022-04-30 PROCEDURE — 84153 ASSAY OF PSA TOTAL: CPT

## 2022-04-30 PROCEDURE — 80061 LIPID PANEL: CPT

## 2022-04-30 PROCEDURE — 80053 COMPREHEN METABOLIC PANEL: CPT

## 2022-04-30 PROCEDURE — 82306 VITAMIN D 25 HYDROXY: CPT

## 2022-04-30 PROCEDURE — 87086 URINE CULTURE/COLONY COUNT: CPT

## 2022-04-30 PROCEDURE — 36415 COLL VENOUS BLD VENIPUNCTURE: CPT

## 2022-04-30 PROCEDURE — 84443 ASSAY THYROID STIM HORMONE: CPT

## 2022-04-30 PROCEDURE — 85025 COMPLETE CBC W/AUTO DIFF WBC: CPT

## 2022-04-30 PROCEDURE — 81001 URINALYSIS AUTO W/SCOPE: CPT

## 2022-08-08 ENCOUNTER — TELEPHONE (OUTPATIENT)
Dept: GASTROENTEROLOGY | Facility: CLINIC | Age: 57
End: 2022-08-08

## 2022-08-08 NOTE — TELEPHONE ENCOUNTER
----- Message from Donnie Pemberton, Cady Jenkins sent at 10/23/2019 11:53 AM CDT -----  Regarding: 3 yr CLN recall w/Dr Carol Boykin  Recall colon in 3 years per.  Colon done 10/08/19 w/Dr Carol Boykin    GI staff: please place recall for colonoscopy in 3 years

## 2022-08-08 NOTE — TELEPHONE ENCOUNTER
[place]    I reviewed below questions with spouse Elsy Haji. Please provide orders if appropriate. Thank you      Last Procedure, Date, MD:  Colonoscopy Dr. Meghana Trejo 10/8/2019  Last Diagnosis:  Colon polyps, internal hemorrhoids  Recalled for (mth/yrs): 3 years  Sedation used previously:  MAC  Last Prep Used (if known):  Trilyte  Quality of prep (if known): good  Anticoagulants: no  Diabetic Meds: no  BP meds(Ace inhibitors/ARB's): lisinopril  Weight loss meds (phentermine/vyvanse): no  Iron supplement (RX/OTC): no  Height & Weight/BMI: 5'6\" 209 lbs BMI 33.78  Hx of Cardiac/CVA issues/(MI/Stroke): no  Devices Pacemaker/Defibrillator/Stents: no  Resp. Issues/Oxygen Use/ESTELITA/COPD: no  Issues w/Anesthesia: no    Symptoms (Y/N): no  Symptoms Details: no    Special comments/notes:    Please advise on orders and prep, thank you.

## 2022-08-09 RX ORDER — POLYETHYLENE GLYCOL 3350, SODIUM CHLORIDE, SODIUM BICARBONATE, POTASSIUM CHLORIDE 420; 11.2; 5.72; 1.48 G/4L; G/4L; G/4L; G/4L
POWDER, FOR SOLUTION ORAL
Qty: 4000 ML | Refills: 0 | Status: SHIPPED | OUTPATIENT
Start: 2022-08-09

## 2022-08-09 NOTE — TELEPHONE ENCOUNTER
Scheduling:  cln w/ mac w/ Dr. Radha Katz  Dx: colon polyps  trilyte split dose sent e-scribe  Hold lisinopril am of procedure

## 2022-11-03 RX ORDER — POLYETHYLENE GLYCOL 3350, SODIUM CHLORIDE, SODIUM BICARBONATE, POTASSIUM CHLORIDE 420; 11.2; 5.72; 1.48 G/4L; G/4L; G/4L; G/4L
POWDER, FOR SOLUTION ORAL
Qty: 4000 ML | Refills: 0 | Status: SHIPPED | OUTPATIENT
Start: 2022-11-03

## 2022-11-14 ENCOUNTER — LAB REQUISITION (OUTPATIENT)
Dept: SURGERY | Age: 57
End: 2022-11-14
Payer: COMMERCIAL

## 2022-11-14 ENCOUNTER — SURGERY CENTER DOCUMENTATION (OUTPATIENT)
Dept: SURGERY | Age: 57
End: 2022-11-14

## 2022-11-14 DIAGNOSIS — Z12.11 SPECIAL SCREENING FOR MALIGNANT NEOPLASMS, COLON: ICD-10-CM

## 2022-11-14 DIAGNOSIS — K63.5 POLYP OF COLON, UNSPECIFIED PART OF COLON, UNSPECIFIED TYPE: ICD-10-CM

## 2022-11-14 DIAGNOSIS — Z86.010 PERSONAL HISTORY OF COLONIC POLYPS: ICD-10-CM

## 2022-11-14 PROCEDURE — 88305 TISSUE EXAM BY PATHOLOGIST: CPT | Performed by: INTERNAL MEDICINE

## 2022-11-23 ENCOUNTER — TELEPHONE (OUTPATIENT)
Facility: CLINIC | Age: 57
End: 2022-11-23

## 2022-11-23 NOTE — TELEPHONE ENCOUNTER
Results letter mailed to patient per   Colon recall entered for repeat in 7 yrs,11/14/2029  Colon done in 11/14/2022   Updated and Patient Outreach was placed for Colon recall  Morgan Mckeon MD  [facility] Clinical Staff  GI staff: please place recall for colonoscopy in 7 years.

## 2023-06-21 DIAGNOSIS — Z00.00 PHYSICAL EXAM: ICD-10-CM

## 2023-06-21 DIAGNOSIS — I10 ESSENTIAL HYPERTENSION: ICD-10-CM

## 2023-06-21 RX ORDER — LISINOPRIL 20 MG/1
20 TABLET ORAL DAILY
Qty: 90 TABLET | Refills: 2 | Status: CANCELLED | OUTPATIENT
Start: 2023-06-21

## 2023-07-03 ENCOUNTER — OFFICE VISIT (OUTPATIENT)
Dept: FAMILY MEDICINE CLINIC | Facility: CLINIC | Age: 58
End: 2023-07-03

## 2023-07-03 VITALS
DIASTOLIC BLOOD PRESSURE: 82 MMHG | HEART RATE: 79 BPM | SYSTOLIC BLOOD PRESSURE: 156 MMHG | HEIGHT: 66 IN | BODY MASS INDEX: 35.03 KG/M2 | WEIGHT: 218 LBS

## 2023-07-03 DIAGNOSIS — I10 ESSENTIAL HYPERTENSION: ICD-10-CM

## 2023-07-03 RX ORDER — LISINOPRIL 20 MG/1
20 TABLET ORAL DAILY
Qty: 90 TABLET | Refills: 1 | Status: SHIPPED | OUTPATIENT
Start: 2023-07-03

## 2023-07-06 ENCOUNTER — LAB ENCOUNTER (OUTPATIENT)
Dept: LAB | Age: 58
End: 2023-07-06
Attending: NURSE PRACTITIONER
Payer: COMMERCIAL

## 2023-07-06 ENCOUNTER — OFFICE VISIT (OUTPATIENT)
Dept: FAMILY MEDICINE CLINIC | Facility: CLINIC | Age: 58
End: 2023-07-06

## 2023-07-06 VITALS
SYSTOLIC BLOOD PRESSURE: 123 MMHG | HEIGHT: 66 IN | WEIGHT: 216 LBS | BODY MASS INDEX: 34.72 KG/M2 | DIASTOLIC BLOOD PRESSURE: 85 MMHG | HEART RATE: 84 BPM

## 2023-07-06 DIAGNOSIS — Z12.5 SCREENING PSA (PROSTATE SPECIFIC ANTIGEN): ICD-10-CM

## 2023-07-06 DIAGNOSIS — Z00.00 WELL ADULT EXAM: Primary | ICD-10-CM

## 2023-07-06 DIAGNOSIS — Z00.00 WELL ADULT EXAM: ICD-10-CM

## 2023-07-06 DIAGNOSIS — I10 PRIMARY HYPERTENSION: ICD-10-CM

## 2023-07-06 DIAGNOSIS — J30.1 SEASONAL ALLERGIC RHINITIS DUE TO POLLEN: ICD-10-CM

## 2023-07-06 LAB
ALBUMIN SERPL-MCNC: 3.7 G/DL (ref 3.4–5)
ALBUMIN/GLOB SERPL: 1.2 {RATIO} (ref 1–2)
ALP LIVER SERPL-CCNC: 72 U/L
ALT SERPL-CCNC: 34 U/L
ANION GAP SERPL CALC-SCNC: 5 MMOL/L (ref 0–18)
AST SERPL-CCNC: 24 U/L (ref 15–37)
BILIRUB SERPL-MCNC: 0.4 MG/DL (ref 0.1–2)
BUN BLD-MCNC: 13 MG/DL (ref 7–18)
CALCIUM BLD-MCNC: 9.2 MG/DL (ref 8.5–10.1)
CHLORIDE SERPL-SCNC: 105 MMOL/L (ref 98–112)
CHOLEST SERPL-MCNC: 155 MG/DL (ref ?–200)
CO2 SERPL-SCNC: 28 MMOL/L (ref 21–32)
COMPLEXED PSA SERPL-MCNC: 1.32 NG/ML (ref ?–4)
CREAT BLD-MCNC: 0.76 MG/DL
ERYTHROCYTE [DISTWIDTH] IN BLOOD BY AUTOMATED COUNT: 13.8 %
EST. AVERAGE GLUCOSE BLD GHB EST-MCNC: 126 MG/DL (ref 68–126)
FASTING PATIENT LIPID ANSWER: YES
FASTING STATUS PATIENT QL REPORTED: YES
GFR SERPLBLD BASED ON 1.73 SQ M-ARVRAT: 103 ML/MIN/1.73M2 (ref 60–?)
GLOBULIN PLAS-MCNC: 3.2 G/DL (ref 2.8–4.4)
GLUCOSE BLD-MCNC: 141 MG/DL (ref 70–99)
HBA1C MFR BLD: 6 % (ref ?–5.7)
HCT VFR BLD AUTO: 50.3 %
HDLC SERPL-MCNC: 39 MG/DL (ref 40–59)
HGB BLD-MCNC: 16.3 G/DL
LDLC SERPL CALC-MCNC: 96 MG/DL (ref ?–100)
MCH RBC QN AUTO: 29.2 PG (ref 26–34)
MCHC RBC AUTO-ENTMCNC: 32.4 G/DL (ref 31–37)
MCV RBC AUTO: 90.1 FL
NONHDLC SERPL-MCNC: 116 MG/DL (ref ?–130)
OSMOLALITY SERPL CALC.SUM OF ELEC: 288 MOSM/KG (ref 275–295)
PLATELET # BLD AUTO: 243 10(3)UL (ref 150–450)
POTASSIUM SERPL-SCNC: 4.4 MMOL/L (ref 3.5–5.1)
PROT SERPL-MCNC: 6.9 G/DL (ref 6.4–8.2)
RBC # BLD AUTO: 5.58 X10(6)UL
SODIUM SERPL-SCNC: 138 MMOL/L (ref 136–145)
TRIGL SERPL-MCNC: 112 MG/DL (ref 30–149)
TSI SER-ACNC: 2.37 MIU/ML (ref 0.36–3.74)
VIT B12 SERPL-MCNC: 476 PG/ML (ref 193–986)
VIT D+METAB SERPL-MCNC: 24.1 NG/ML (ref 30–100)
VLDLC SERPL CALC-MCNC: 18 MG/DL (ref 0–30)
WBC # BLD AUTO: 10.1 X10(3) UL (ref 4–11)

## 2023-07-06 PROCEDURE — 80061 LIPID PANEL: CPT

## 2023-07-06 PROCEDURE — 36415 COLL VENOUS BLD VENIPUNCTURE: CPT

## 2023-07-06 PROCEDURE — 80053 COMPREHEN METABOLIC PANEL: CPT

## 2023-07-06 PROCEDURE — 84443 ASSAY THYROID STIM HORMONE: CPT

## 2023-07-06 PROCEDURE — 82306 VITAMIN D 25 HYDROXY: CPT

## 2023-07-06 PROCEDURE — 3074F SYST BP LT 130 MM HG: CPT | Performed by: NURSE PRACTITIONER

## 2023-07-06 PROCEDURE — 3008F BODY MASS INDEX DOCD: CPT | Performed by: NURSE PRACTITIONER

## 2023-07-06 PROCEDURE — 85027 COMPLETE CBC AUTOMATED: CPT

## 2023-07-06 PROCEDURE — 82607 VITAMIN B-12: CPT

## 2023-07-06 PROCEDURE — 83036 HEMOGLOBIN GLYCOSYLATED A1C: CPT

## 2023-07-06 PROCEDURE — 3079F DIAST BP 80-89 MM HG: CPT | Performed by: NURSE PRACTITIONER

## 2023-07-06 PROCEDURE — 99396 PREV VISIT EST AGE 40-64: CPT | Performed by: NURSE PRACTITIONER

## 2023-07-06 NOTE — ASSESSMENT & PLAN NOTE
Screening labs  Please aim to eat a diet high in fresh fruits and vegetables, lean protein sources, complex carbohydrates and limited processed and fast foods. Try to get at least 150 minutes of exercise per week-a combination of weight resistance and cardio is preferred.     Up to date colonoscopy  Psa   Had first shingles shot earlier this week-follow up 2 months for shingrix #2

## 2023-08-01 ENCOUNTER — OFFICE VISIT (OUTPATIENT)
Dept: FAMILY MEDICINE CLINIC | Facility: CLINIC | Age: 58
End: 2023-08-01

## 2023-08-01 VITALS
SYSTOLIC BLOOD PRESSURE: 128 MMHG | DIASTOLIC BLOOD PRESSURE: 68 MMHG | HEART RATE: 91 BPM | BODY MASS INDEX: 34.68 KG/M2 | WEIGHT: 215.81 LBS | HEIGHT: 66 IN

## 2023-08-01 DIAGNOSIS — R73.03 PREDIABETES: ICD-10-CM

## 2023-08-01 DIAGNOSIS — I10 ESSENTIAL HYPERTENSION: Primary | ICD-10-CM

## 2023-08-01 DIAGNOSIS — E55.9 VITAMIN D DEFICIENCY: ICD-10-CM

## 2023-08-01 PROCEDURE — 3008F BODY MASS INDEX DOCD: CPT | Performed by: FAMILY MEDICINE

## 2023-08-01 PROCEDURE — 3078F DIAST BP <80 MM HG: CPT | Performed by: FAMILY MEDICINE

## 2023-08-01 PROCEDURE — 3074F SYST BP LT 130 MM HG: CPT | Performed by: FAMILY MEDICINE

## 2023-08-01 PROCEDURE — 99213 OFFICE O/P EST LOW 20 MIN: CPT | Performed by: FAMILY MEDICINE

## 2023-08-01 RX ORDER — ERGOCALCIFEROL 1.25 MG/1
50000 CAPSULE ORAL WEEKLY
Qty: 12 CAPSULE | Refills: 4 | Status: SHIPPED | OUTPATIENT
Start: 2023-08-01 | End: 2023-08-31

## 2024-01-03 DIAGNOSIS — I10 ESSENTIAL HYPERTENSION: ICD-10-CM

## 2024-01-03 RX ORDER — LISINOPRIL 20 MG/1
20 TABLET ORAL DAILY
Qty: 90 TABLET | Refills: 3 | Status: SHIPPED | OUTPATIENT
Start: 2024-01-03

## 2024-01-03 NOTE — TELEPHONE ENCOUNTER
Refill passed per [facility] protocol.  Requested Prescriptions   Pending Prescriptions Disp Refills    LISINOPRIL 20 MG Oral Tab [Pharmacy Med Name: LISINOPRIL 20MG TABLETS] 90 tablet 1     Sig: TAKE 1 TABLET(20 MG) BY MOUTH DAILY       Hypertensive Medications Protocol Passed - 1/3/2024  3:47 AM        Passed - In person appointment in the past 12 or next 3 months     Recent Outpatient Visits              5 months ago Essential hypertension    [facility] ([place], [place]), Desmond Wilkerson MD    Office Visit    6 months ago Well adult exam    [facility] ([place] [place]), Brina Watters APRN    Office Visit    6 months ago Essential hypertension    [facility], [place], Minhxuyen, PA-C    Office Visit    1 year ago Physical exam    [facility] ([place] [place]Antonio Ricardo, MD    Office Visit    2 years ago Essential hypertension    [facility] ([place] [place]), Desmond Wilkerson MD    Office Visit          Future Appointments         Provider Department Appt Notes    In 4 weeks Desmond Morris MD [facility], [place]), Antonio 6mo fu               Passed - Last BP reading less than 140/90     BP Readings from Last 1 Encounters:   08/01/23 128/68               Passed - CMP or BMP in past 6 months     Recent Results (from the past 4392 hour(s))   Comp Metabolic Panel (14)    Collection Time: 07/06/23 10:03 AM   Result Value Ref Range    Glucose 141 (H) 70 - 99 mg/dL    Sodium 138 136 - 145 mmol/L    Potassium 4.4 3.5 - 5.1 mmol/L    Chloride 105 98 - 112 mmol/L    CO2 28.0 21.0 - 32.0 mmol/L    Anion Gap 5 0 - 18 mmol/L    BUN 13 7 - 18 mg/dL    Creatinine 0.76 0.70 - 1.30 mg/dL    Calcium, Total 9.2 8.5 - 10.1 mg/dL    Calculated Osmolality 288 275 - 295 mOsm/kg    eGFR-Cr 103 >=60 mL/min/1.73m2    AST 24 15 - 37 U/L    ALT 34 16 - 61 U/L     Alkaline Phosphatase 72 45 - 117 U/L    Bilirubin, Total 0.4 0.1 - 2.0 mg/dL    Total Protein 6.9 6.4 - 8.2 g/dL    Albumin 3.7 3.4 - 5.0 g/dL    Globulin  3.2 2.8 - 4.4 g/dL    A/G Ratio 1.2 1.0 - 2.0    Patient Fasting for CMP? Yes      *Note: Due to a large number of results and/or encounters for the requested time period, some results have not been displayed. A complete set of results can be found in Results Review.               Passed - In person appointment or virtual visit in the past 6 months     Recent Outpatient Visits              5 months ago [facility]    [facility] Visit    6 months ago Well adult exam    [facility]    Office Visit    6 months ago Essential hypertension    [facility], [place], Minhxuyen, PA-C    Office Visit    1 year ago Physical exam    [facility], Desmond Wilkerson MD    Office Visit    2 years ago Essential hypertension    [facility], Desmond Wilkerson MD    Office Visit          Future Appointments         Provider Department Appt Notes    In 4 weeks [facility]               Passed - EGFRCR or GFRNAA > 50     GFR Evaluation  EGFRCR: 103 , resulted on 7/6/2023             Future Appointments         Provider Department Appt Notes    In 4 weeks [facility]          Recent Outpatient Visits              5 months ago Essential hypertension    [facility]    Office Visit    6 months ago Well adult exam    [facility]    Office Visit    6 months ago  Essential hypertension    [facility] [place], Minhxuyen, PA-C    Office Visit    1 year ago Physical exam    [facility], [address], Desmond Wilkerson MD    Office Visit    2 years ago Essential hypertension    [facility], [address], Desmond Wilkerson MD    Office Visit

## 2024-02-01 ENCOUNTER — LAB ENCOUNTER (OUTPATIENT)
Dept: LAB | Age: 59
End: 2024-02-01
Attending: FAMILY MEDICINE
Payer: COMMERCIAL

## 2024-02-01 ENCOUNTER — OFFICE VISIT (OUTPATIENT)
Dept: FAMILY MEDICINE CLINIC | Facility: CLINIC | Age: 59
End: 2024-02-01
Payer: COMMERCIAL

## 2024-02-01 VITALS
DIASTOLIC BLOOD PRESSURE: 84 MMHG | HEART RATE: 86 BPM | BODY MASS INDEX: 34.39 KG/M2 | WEIGHT: 214 LBS | HEIGHT: 66 IN | SYSTOLIC BLOOD PRESSURE: 176 MMHG

## 2024-02-01 DIAGNOSIS — R73.03 PREDIABETES: ICD-10-CM

## 2024-02-01 DIAGNOSIS — B35.3 TINEA PEDIS OF BOTH FEET: ICD-10-CM

## 2024-02-01 DIAGNOSIS — I10 ESSENTIAL HYPERTENSION: Primary | ICD-10-CM

## 2024-02-01 LAB
EST. AVERAGE GLUCOSE BLD GHB EST-MCNC: 128 MG/DL (ref 68–126)
HBA1C MFR BLD: 6.1 % (ref ?–5.7)

## 2024-02-01 PROCEDURE — 3079F DIAST BP 80-89 MM HG: CPT | Performed by: FAMILY MEDICINE

## 2024-02-01 PROCEDURE — 3008F BODY MASS INDEX DOCD: CPT | Performed by: FAMILY MEDICINE

## 2024-02-01 PROCEDURE — 99214 OFFICE O/P EST MOD 30 MIN: CPT | Performed by: FAMILY MEDICINE

## 2024-02-01 PROCEDURE — 36415 COLL VENOUS BLD VENIPUNCTURE: CPT

## 2024-02-01 PROCEDURE — 83036 HEMOGLOBIN GLYCOSYLATED A1C: CPT

## 2024-02-01 PROCEDURE — 3077F SYST BP >= 140 MM HG: CPT | Performed by: FAMILY MEDICINE

## 2024-02-01 RX ORDER — ERGOCALCIFEROL 1.25 MG/1
50000 CAPSULE ORAL WEEKLY
COMMUNITY
Start: 2024-01-18

## 2024-02-01 RX ORDER — KETOCONAZOLE 20 MG/G
CREAM TOPICAL
Qty: 60 G | Refills: 1 | Status: SHIPPED | OUTPATIENT
Start: 2024-02-01

## 2024-02-01 RX ORDER — CHLORTHALIDONE 25 MG/1
25 TABLET ORAL DAILY
Qty: 30 TABLET | Refills: 3 | Status: SHIPPED | OUTPATIENT
Start: 2024-02-01

## 2024-02-01 RX ORDER — AMLODIPINE BESYLATE 5 MG/1
5 TABLET ORAL DAILY
Qty: 30 TABLET | Refills: 2 | Status: SHIPPED | OUTPATIENT
Start: 2024-02-01

## 2024-02-01 NOTE — PROGRESS NOTES
2/1/2024  1:36 PM    Nj Holm is a 58 year old male.    Chief complaint(s):   Chief Complaint   Patient presents with    Follow - Up     6 months HTN/ Pre-Diabetes.      HPI:     Nj Holm primary complaint is regarding as above.     Nj Golden a 58 year old male presents with hypertension.  This was first diagnosed more than Feb 2021.  Current nonpharmacologic treatment includes low sodium diet, exercise, and meditation.  His current cardiac medication(s) regimen includes: Lisinopril 20 mg .  He has kept a blood pressure diary, but states that his blood pressures have been well controll.  he is tolerating his medication(s)  well without side effects.  Compliance with treatment has been good. And also has low vit D and prediabetes.        Nj Holm is a 58 year old male who presents with a rash. Symptoms have been present for 2  yres . This rash has appeared before. Previous dermatologic condition include fungus. The rash is located on the feet. Since then it has not spread. Parent has tried nothing for initial treatment and the rash has worsened. Itchiness and discomfort has been is moderate. Patient does not have a fever. Recent illnesses: none. Sick contacts: none known. Possible contribution elements include sweaty feet.      HISTORY:  Past Medical History:   Diagnosis Date    Colon polyps 2022    repeat CLN in 7 years    High blood pressure     Prediabetes       Past Surgical History:   Procedure Laterality Date    COLONOSCOPY N/A 10/8/2019    Procedure: COLONOSCOPY;  Surgeon: MARTHA Valles MD;  [facility]: [facility] ENDOSCOPY    OTHER Left     Wrist surgery die accidental laceration      Family History   Problem Relation Age of Onset    Hypertension Mother     Cancer Sister         unknown cancer      Social History:   Social History     Socioeconomic History    Marital status:    Tobacco Use    Smoking status: Former     Years: 8     Types: Cigarettes     Quit date: 3/20/1985      Years since quittin.8    Smokeless tobacco: Never    Tobacco comments:     exposure to cigarettes by roomate   Vaping Use    Vaping Use: Never used   Substance and Sexual Activity    Alcohol use: Yes     Alcohol/week: 5.0 standard drinks of alcohol     Types: 5 Cans of beer per week    Drug use: No     Comment: mariguana (15 years old); quit (19 years old)    Sexual activity: Yes        Immunizations:   Immunization History   Administered Date(s) Administered    Covid-19 Vaccine Pfizer 30 mcg/0.3 ml 2021, 2021    TDAP 2019    Zoster Vaccine Recombinant Adjuvanted (Shingrix) 2023   Pended Date(s) Pended    Zoster Vaccine Recombinant Adjuvanted (Shingrix) 2022       Medications (Active prior to today's visit):  Current Outpatient Medications   Medication Sig Dispense Refill    ergocalciferol 1.25 MG (57858 UT) Oral Cap Take 1 capsule (50,000 Units total) by mouth once a week.      chlorthalidone 25 MG Oral Tab Take 1 tablet (25 mg total) by mouth daily. 30 tablet 3    amLODIPine 5 MG Oral Tab Take 1 tablet (5 mg total) by mouth daily. 30 tablet 2    ketoconazole 2 % External Cream Apply to affected area BID. 60 g 1    lisinopril 20 MG Oral Tab Take 1 tablet (20 mg total) by mouth daily. 90 tablet 3    triamcinolone acetonide 0.1 % External Cream Apply topically 2 (two) times daily as needed. 60 g 1    PEG 3350-KCl-Na Bicarb-NaCl (TRILYTE) 420 g Oral Recon Soln Take prep as directed by gastro office. May substitute with Trilyte/generic equivalent if needed. (Patient not taking: Reported on 2024) 4000 mL 0       Allergies:  No Known Allergies      ROS:   Review of Systems   Constitutional:  Negative for appetite change, fatigue and fever.   Respiratory:  Negative for shortness of breath.    Cardiovascular:  Negative for chest pain.   Gastrointestinal:  Negative for abdominal pain.   Musculoskeletal:  Negative for myalgias.   Skin:  Negative for rash.        Itchy feet    Neurological:  Negative for dizziness, weakness and headaches.       PHYSICAL EXAM:   VS: BP (!) 176/84   Pulse 86   Ht 5' 6\" (1.676 m)   Wt 214 lb (97.1 kg)   BMI 34.54 kg/m²     Physical Exam  Vitals reviewed.   Constitutional:       Appearance: Normal appearance. He is well-developed.   HENT:      Head: Normocephalic.   Eyes:      General: No scleral icterus.     Conjunctiva/sclera: Conjunctivae normal.   Cardiovascular:      Rate and Rhythm: Normal rate and regular rhythm.      Heart sounds: Normal heart sounds.   Pulmonary:      Effort: Pulmonary effort is normal.      Breath sounds: Normal breath sounds.   Musculoskeletal:      Cervical back: Neck supple.   Feet:      Comments: In between toes fissures and white plaques  Skin:     Findings: No rash.   Psychiatric:         Mood and Affect: Mood normal.         LABORATORY RESULTS:     EKG / Spirometry : -     Radiology: No results found.     ASSESSMENT/PLAN:   Assessment   Encounter Diagnoses   Name Primary?    Essential hypertension Yes    Prediabetes     Tinea pedis of both feet        1. Essential hypertension      MEDICATIONS:   Requested Prescriptions     Signed Prescriptions Disp Refills    chlorthalidone 25 MG Oral Tab 30 tablet 3     Sig: Take 1 tablet (25 mg total) by mouth daily.    amLODIPine 5 MG Oral Tab 30 tablet 2     Sig: Take 1 tablet (5 mg total) by mouth daily.   Lisinopril 20 mg  RECOMMENDATIONS given include: avoid pseudoephedrine or other stimulants/decongestants in common cold remedies, decrease consumption of alcohol, perform routine monitoring of blood pressure with home blood pressure cuff, exercise, reduction of dietary salt intake, take medication as prescribed, try not to miss doses, smoking cessation, weight loss, and stress reduction.    FOLLOW-UP: Schedule a follow-up visit in 1 months.         2. Prediabetes  Lab:  Hemoglobin A1C; Future    3. Tinea pedis of both feet    MEDICATIONS:      ketoconazole 2 % External Cream 60  g 1     Sig: Apply to affected area BID.   RECOMMENDATIONS given include: Patient was reassured of  his medical condition and all questions and concerns were answered. Patient was informed to please, call our office with any new or further questions or concerns that may come up in the near future. Notify Dr Silverio or the [facility] if there is a deterioration or worsening of the medical condition. Also, inform the doctor with any new symptoms or medications' side effects.  Keep feet clean and dry.    FOLLOW-UP: Schedule a follow-up visit in 1 month.             Orders This Visit:  Orders Placed This Encounter   Procedures    Hemoglobin A1C       Meds This Visit:  Requested Prescriptions     Signed Prescriptions Disp Refills    chlorthalidone 25 MG Oral Tab 30 tablet 3     Sig: Take 1 tablet (25 mg total) by mouth daily.    amLODIPine 5 MG Oral Tab 30 tablet 2     Sig: Take 1 tablet (5 mg total) by mouth daily.    ketoconazole 2 % External Cream 60 g 1     Sig: Apply to affected area BID.       Imaging & Referrals:  None         ASTRID SILVERIO MD

## 2024-02-02 ENCOUNTER — TELEPHONE (OUTPATIENT)
Dept: FAMILY MEDICINE CLINIC | Facility: CLINIC | Age: 59
End: 2024-02-02

## 2024-02-02 NOTE — TELEPHONE ENCOUNTER
Patient's wife calling asking about patient's medications that were prescribed at yesterday's office visit as patient could not recall and she was not able to be there.    All questions answered.  A1C lab results given (refer to lab result)  Follow up appointment made    Future Appointments   Date Time Provider Department Center   3/4/2024  1:30 PM Desmond Morris MD [facility]

## 2024-03-04 ENCOUNTER — OFFICE VISIT (OUTPATIENT)
Dept: FAMILY MEDICINE CLINIC | Facility: CLINIC | Age: 59
End: 2024-03-04
Payer: COMMERCIAL

## 2024-03-04 VITALS
SYSTOLIC BLOOD PRESSURE: 139 MMHG | DIASTOLIC BLOOD PRESSURE: 78 MMHG | HEART RATE: 111 BPM | WEIGHT: 212 LBS | HEIGHT: 66 IN | BODY MASS INDEX: 34.07 KG/M2

## 2024-03-04 DIAGNOSIS — I10 ESSENTIAL HYPERTENSION: Primary | ICD-10-CM

## 2024-03-04 PROCEDURE — 99213 OFFICE O/P EST LOW 20 MIN: CPT | Performed by: FAMILY MEDICINE

## 2024-03-04 PROCEDURE — 3075F SYST BP GE 130 - 139MM HG: CPT | Performed by: FAMILY MEDICINE

## 2024-03-04 PROCEDURE — 3078F DIAST BP <80 MM HG: CPT | Performed by: FAMILY MEDICINE

## 2024-03-04 PROCEDURE — 3008F BODY MASS INDEX DOCD: CPT | Performed by: FAMILY MEDICINE

## 2024-03-04 RX ORDER — OLMESARTAN MEDOXOMIL 40 MG/1
40 TABLET ORAL DAILY
Qty: 30 TABLET | Refills: 3 | Status: SHIPPED | OUTPATIENT
Start: 2024-03-04

## 2024-03-04 NOTE — PROGRESS NOTES
3/4/2024  1:52 PM    Nj Holm is a 59 year old male.    Chief complaint(s):   Chief Complaint   Patient presents with    Follow - Up     Blood Pressure     HPI:     Nj Holm primary complaint is regarding HTN.     Nj Golden a 58 year old male presents with hypertension.  This was first diagnosed more than 2021.  Current nonpharmacologic treatment includes low sodium diet, exercise, and meditation.  His current cardiac medication(s) regimen includes: Lisinopril 20 mg, Chlorthalidone 25 mg, Amlodipine 5 mg .  He has kept a blood pressure diary, but states that his blood pressures have been well controll.  He is tolerating his medication(s)  well without side effects.  Compliance with treatment has been good. And also has low vit D and prediabetes.         HISTORY:  Past Medical History:   Diagnosis Date    Colon polyps     repeat CLN in 7 years    High blood pressure     Prediabetes       Past Surgical History:   Procedure Laterality Date    COLONOSCOPY N/A 10/8/2019    Procedure: COLONOSCOPY;  Surgeon: MARTHA Valles MD;  Location: [facility] ENDOSCOPY    OTHER Left     Wrist surgery die accidental laceration      Family History   Problem Relation Age of Onset    Hypertension Mother     Cancer Sister         unknown cancer      Social History:   Social History     Socioeconomic History    Marital status:    Tobacco Use    Smoking status: Former     Years: 8     Types: Cigarettes     Quit date: 3/20/1985     Years since quittin.9    Smokeless tobacco: Never    Tobacco comments:     exposure to cigarettes by roomate   Vaping Use    Vaping Use: Never used   Substance and Sexual Activity    Alcohol use: Yes     Alcohol/week: 5.0 standard drinks of alcohol     Types: 5 Cans of beer per week    Drug use: No     Comment: mariguana (15 years old); quit (19 years old)    Sexual activity: Yes        Immunizations:   Immunization History   Administered Date(s) Administered    Covid-19  Vaccine Pfizer 30 mcg/0.3 ml 03/16/2021, 04/06/2021    TDAP 03/20/2019    Zoster Vaccine Recombinant Adjuvanted (Shingrix) 07/03/2023   Pended Date(s) Pended    Zoster Vaccine Recombinant Adjuvanted (Shingrix) 03/30/2022       Medications (Active prior to today's visit):  Current Outpatient Medications   Medication Sig Dispense Refill    Olmesartan Medoxomil 40 MG Oral Tab Take 1 tablet (40 mg total) by mouth daily. 30 tablet 3    ergocalciferol 1.25 MG (85447 UT) Oral Cap Take 1 capsule (50,000 Units total) by mouth once a week.      chlorthalidone 25 MG Oral Tab Take 1 tablet (25 mg total) by mouth daily. 30 tablet 3    amLODIPine 5 MG Oral Tab Take 1 tablet (5 mg total) by mouth daily. 30 tablet 2    ketoconazole 2 % External Cream Apply to affected area BID. 60 g 1    lisinopril 20 MG Oral Tab Take 1 tablet (20 mg total) by mouth daily. 90 tablet 3    triamcinolone acetonide 0.1 % External Cream Apply topically 2 (two) times daily as needed. 60 g 1    PEG 3350-KCl-Na Bicarb-NaCl (TRILYTE) 420 g Oral Recon Soln Take prep as directed by gastro office. May substitute with Trilyte/generic equivalent if needed. (Patient not taking: Reported on 2/1/2024) 4000 mL 0       Allergies:  No Known Allergies      ROS:   Review of Systems   Constitutional:  Negative for appetite change, fatigue and fever.   Respiratory:  Negative for shortness of breath.    Cardiovascular:  Negative for chest pain.   Gastrointestinal:  Negative for abdominal pain.   Musculoskeletal:  Negative for myalgias.   Skin:  Negative for rash.   Neurological:  Negative for dizziness, weakness and headaches.       PHYSICAL EXAM:   VS: /78   Pulse 111   Ht 5' 6\" (1.676 m)   Wt 212 lb (96.2 kg)   BMI 34.22 kg/m²     Physical Exam  Vitals reviewed.   Constitutional:       Appearance: Normal appearance. He is well-developed.   HENT:      Head: Normocephalic.   Eyes:      General: No scleral icterus.     Conjunctiva/sclera: Conjunctivae normal.    Cardiovascular:      Rate and Rhythm: Normal rate.   Pulmonary:      Effort: Pulmonary effort is normal.   Musculoskeletal:      Cervical back: Neck supple.   Skin:     Findings: No rash.   Psychiatric:         Mood and Affect: Mood normal.         LABORATORY RESULTS:    EKG / Spirometry : -     Radiology: No results found.     ASSESSMENT/PLAN:   Assessment   Encounter Diagnosis   Name Primary?    Essential hypertension Yes       MEDICATIONS:  Chlorthalidone 25 mg, Amlodipine 5 mg, +  Requested Prescriptions     Signed Prescriptions Disp Refills    Olmesartan Medoxomil 40 MG Oral Tab 30 tablet 3     Sig: Take 1 tablet (40 mg total) by mouth daily.    Stop Lisinopril    LABORATORY & ORDERS: No orders of the defined types were placed in this encounter.    RECOMMENDATIONS given include: avoid pseudoephedrine or other stimulants/decongestants in common cold remedies, decrease consumption of alcohol, perform routine monitoring of blood pressure with home blood pressure cuff, exercise, reduction of dietary salt intake, take medication as prescribed, try not to miss doses, smoking cessation, weight loss, and stress reduction.    FOLLOW-UP: Schedule a follow-up visit in 1 months.              Orders This Visit:  No orders of the defined types were placed in this encounter.      Meds This Visit:  Requested Prescriptions     Signed Prescriptions Disp Refills    Olmesartan Medoxomil 40 MG Oral Tab 30 tablet 3     Sig: Take 1 tablet (40 mg total) by mouth daily.       Imaging & Referrals:  None         ASTRID SILVERIO MD

## 2024-04-01 ENCOUNTER — OFFICE VISIT (OUTPATIENT)
Dept: FAMILY MEDICINE CLINIC | Facility: CLINIC | Age: 59
End: 2024-04-01
Payer: COMMERCIAL

## 2024-04-01 VITALS
HEIGHT: 66 IN | BODY MASS INDEX: 33.72 KG/M2 | SYSTOLIC BLOOD PRESSURE: 109 MMHG | WEIGHT: 209.81 LBS | HEART RATE: 79 BPM | DIASTOLIC BLOOD PRESSURE: 69 MMHG

## 2024-04-01 DIAGNOSIS — I10 ESSENTIAL HYPERTENSION: Primary | ICD-10-CM

## 2024-04-01 PROCEDURE — 3074F SYST BP LT 130 MM HG: CPT | Performed by: FAMILY MEDICINE

## 2024-04-01 PROCEDURE — 90471 IMMUNIZATION ADMIN: CPT | Performed by: FAMILY MEDICINE

## 2024-04-01 PROCEDURE — 3008F BODY MASS INDEX DOCD: CPT | Performed by: FAMILY MEDICINE

## 2024-04-01 PROCEDURE — 3078F DIAST BP <80 MM HG: CPT | Performed by: FAMILY MEDICINE

## 2024-04-01 PROCEDURE — 99213 OFFICE O/P EST LOW 20 MIN: CPT | Performed by: FAMILY MEDICINE

## 2024-04-01 PROCEDURE — 90750 HZV VACC RECOMBINANT IM: CPT | Performed by: FAMILY MEDICINE

## 2024-04-01 NOTE — PROGRESS NOTES
2024  2:09 PM    Nj Holm is a 59 year old male.    Chief complaint(s):   Chief Complaint   Patient presents with    Hypertension     F/u      HPI:     Nj Holm primary complaint is regarding HTN.     Nj Golden a 59 year old male presents with hypertension.  This was first diagnosed more than 2021.  Current nonpharmacologic treatment includes low sodium diet, exercise, and meditation.  His current cardiac medication(s) regimen includes: Olmesartan 40 mg, Chlorthalidone 25 mg, Amlodipine 5 mg .  He has kept a blood pressure diary, but states that his blood pressures have been well controll.  He is tolerating his medication(s)  well without side effects.  Compliance with treatment has been good. And also has low vit D and prediabetes.       Also request his 2nd HZV vaccine.      HISTORY:  Past Medical History:   Diagnosis Date    Colon polyps     repeat CLN in 7 years    High blood pressure     Prediabetes       Past Surgical History:   Procedure Laterality Date    COLONOSCOPY N/A 10/8/2019    Procedure: COLONOSCOPY;  Surgeon: MARTHA Valles MD;  Location: [facility] ENDOSCOPY    OTHER Left     Wrist surgery die accidental laceration      Family History   Problem Relation Age of Onset    Hypertension Mother     Cancer Sister         unknown cancer      Social History:   Social History     Socioeconomic History    Marital status:    Tobacco Use    Smoking status: Former     Years: 8     Types: Cigarettes     Quit date: 3/20/1985     Years since quittin.0    Smokeless tobacco: Never    Tobacco comments:     exposure to cigarettes by roomate   Vaping Use    Vaping Use: Never used   Substance and Sexual Activity    Alcohol use: Yes     Alcohol/week: 5.0 standard drinks of alcohol     Types: 5 Cans of beer per week    Drug use: No     Comment: mariguana (15 years old); quit (19 years old)    Sexual activity: Yes        Immunizations:   Immunization History   Administered Date(s)  Administered    Covid-19 Vaccine Pfizer 30 mcg/0.3 ml 03/16/2021, 04/06/2021    TDAP 03/20/2019    Zoster Vaccine Recombinant Adjuvanted (Shingrix) 07/03/2023   Pended Date(s) Pended    Zoster Vaccine Recombinant Adjuvanted (Shingrix) 03/30/2022, 04/01/2024       Medications (Active prior to today's visit):  Current Outpatient Medications   Medication Sig Dispense Refill    ergocalciferol 1.25 MG (45121 UT) Oral Cap Take 1 capsule (50,000 Units total) by mouth once a week.      Olmesartan Medoxomil 40 MG Oral Tab Take 1 tablet (40 mg total) by mouth daily. 30 tablet 3    chlorthalidone 25 MG Oral Tab Take 1 tablet (25 mg total) by mouth daily. 30 tablet 3    amLODIPine 5 MG Oral Tab Take 1 tablet (5 mg total) by mouth daily. 30 tablet 2    ketoconazole 2 % External Cream Apply to affected area BID. 60 g 1    triamcinolone acetonide 0.1 % External Cream Apply topically 2 (two) times daily as needed. 60 g 1       Allergies:  No Known Allergies      ROS:   Review of Systems   Constitutional:  Negative for appetite change, fatigue and fever.   Respiratory:  Negative for shortness of breath.    Cardiovascular:  Negative for chest pain.   Gastrointestinal:  Negative for abdominal pain.   Musculoskeletal:  Negative for myalgias.   Skin:  Negative for rash.   Neurological:  Negative for dizziness, weakness and headaches.       PHYSICAL EXAM:   VS: /69   Pulse 79   Ht 5' 6\" (1.676 m)   Wt 209 lb 12.8 oz (95.2 kg)   BMI 33.86 kg/m²     Physical Exam  Vitals reviewed.   Constitutional:       Appearance: Normal appearance. He is well-developed.   HENT:      Head: Normocephalic.   Eyes:      General: No scleral icterus.     Conjunctiva/sclera: Conjunctivae normal.   Cardiovascular:      Rate and Rhythm: Normal rate.   Pulmonary:      Effort: Pulmonary effort is normal.   Musculoskeletal:      Cervical back: Neck supple.   Skin:     Findings: No rash.   Psychiatric:         Mood and Affect: Mood normal.          LABORATORY RESULTS:     EKG / Spirometry : -     Radiology: No results found.     ASSESSMENT/PLAN:   Assessment   Encounter Diagnosis   Name Primary?    Essential hypertension Yes         MEDICATIONS:    ergocalciferol 1.25 MG (85139 UT) Oral Cap, Take 1 capsule (50,000 Units total) by mouth once a week., Disp: , Rfl:     Olmesartan Medoxomil 40 MG Oral Tab, Take 1 tablet (40 mg total) by mouth daily., Disp: 30 tablet, Rfl: 3    chlorthalidone 25 MG Oral Tab, Take 1 tablet (25 mg total) by mouth daily., Disp: 30 tablet, Rfl: 3    amLODIPine 5 MG Oral Tab, Take 1 tablet (5 mg total) by mouth daily., Disp: 30 tablet, Rfl: 2    LABORATORY & ORDERS:   Orders Placed This Encounter   Procedures    ZOSTER VACC RECOMBINANT IM NJX   RECOMMENDATIONS given include: Patient was reassured of  his medical condition and all questions and concerns were answered. Patient was informed to please, call our office with any new or further questions or concerns that may come up in the near future. Notify Dr Silverio or the [facility] if there is a deterioration or worsening of the medical condition. Also, inform the doctor with any new symptoms or medications' side effects.      FOLLOW-UP: Schedule a follow-up visit in  4 months for CPE.              Orders This Visit:  Orders Placed This Encounter   Procedures    ZOSTER VACC RECOMBINANT IM NJX       Meds This Visit:  Requested Prescriptions      No prescriptions requested or ordered in this encounter       Imaging & Referrals:  ZOSTER VACC RECOMBINANT IM NJX         ASTRID SILVERIO MD

## 2024-04-09 ENCOUNTER — PATIENT MESSAGE (OUTPATIENT)
Dept: FAMILY MEDICINE CLINIC | Facility: CLINIC | Age: 59
End: 2024-04-09

## 2024-04-10 NOTE — TELEPHONE ENCOUNTER
From: Nj Holm  To: ASTRID SILVERIO  Sent: 4/9/2024 12:31 PM CDT  Subject: Medications    Hi,     Can you tell me what medications Nj is supposed to be on and to have refilled.

## 2024-04-11 RX ORDER — AMLODIPINE BESYLATE 5 MG/1
5 TABLET ORAL DAILY
Qty: 90 TABLET | Refills: 3 | Status: SHIPPED | OUTPATIENT
Start: 2024-04-11

## 2024-04-11 NOTE — TELEPHONE ENCOUNTER
Refill Per Protocol     Requested Prescriptions   Pending Prescriptions Disp Refills    amLODIPine 5 MG Oral Tab 30 tablet 2     Sig: Take 1 tablet (5 mg total) by mouth daily.       Hypertension Medications Protocol Passed - 4/10/2024  8:39 AM        Passed - CMP or BMP in past 12 months        Passed - Last BP reading less than 140/90     BP Readings from Last 1 Encounters:   04/01/24 109/69               Passed - In person appointment or virtual visit in the past 12 mos or appointment in next 3 mos     Recent Outpatient Visits              1 week ago Essential hypertension    [facility] HelperDesmond Raza MD    Office Visit    1 month ago [facility]    [facility]    2 months ago Essential hypertension    [facility] Desmond Morris MD    Office Visit    8 months ago Essential hypertension    [facility] Desmond Morris MD    Office Visit    9 months ago Well adult exam    [facility] Brina Serrano APRN    Office Visit          Future Appointments         Provider Department Appt Notes    In 3 months Desmond Morris MD [facility] Hypertension  follow up.                    Passed - EGFRCR or GFRNAA > 50     GFR Evaluation  EGFRCR: 103 , resulted on 7/6/2023                 Future Appointments         Provider Department Appt Notes    In 3 months Desmond Morris MD [facility] Hypertension  follow up.          Recent Outpatient Visits              1 week ago Essential hypertension    [facility] Desmond Morris MD    Office Visit    1 month ago Essential hypertension    [facility]  Desmond Morris MD    Office Visit    2 months ago Essential hypertension    [facility], [address], Desmond Wilkerson MD    Office Visit    8 months ago Essential hypertension    [facility], [address], Desmond Wilkerson MD    Office Visit    9 months ago Well adult exam    [facility], [facility], Brina Watters APRN    Office Visit

## 2024-06-01 RX ORDER — CHLORTHALIDONE 25 MG/1
25 TABLET ORAL DAILY
Qty: 30 TABLET | Refills: 3 | OUTPATIENT
Start: 2024-06-01

## 2024-06-01 RX ORDER — CHLORTHALIDONE 25 MG/1
25 TABLET ORAL DAILY
Qty: 90 TABLET | Refills: 3 | Status: SHIPPED | OUTPATIENT
Start: 2024-06-01

## 2024-06-01 NOTE — TELEPHONE ENCOUNTER
REFILL PASSED PER Located within [facility] PROTOCOLS    Requested Prescriptions   Pending Prescriptions Disp Refills    chlorthalidone 25 MG Oral Tab 30 tablet 3     Sig: Take 1 tablet (25 mg total) by mouth daily.       Hypertension Medications Protocol Passed - 5/29/2024  3:07 PM        Passed - CMP or BMP in past 12 months        Passed - Last BP reading less than 140/90     BP Readings from Last 1 Encounters:   04/01/24 109/69               Passed - In person appointment or virtual visit in the past 12 mos or appointment in next 3 mos     Recent Outpatient Visits              2 months ago Essential hypertension    [place], Desmond Wilkerson MD    Office Visit    2 months ago Essential hypertension    [place], Desmond Wilkerson MD    Office Visit    4 months ago Essential hypertension    [place], Desmond Wilkerson MD    Office Visit    10 months ago [facility]ential [facility]    [facility]    11 months ago Well adult exam    [facility] Brina Serrano APRN    Office Visit          Future Appointments         Provider Department Appt Notes    In 2 months Desmond Morris MD [facility] Hypertension  follow up.                    Passed - EGFRCR or GFRNAA > 50     GFR Evaluation  EGFRCR: 103 , resulted on 7/6/2023               Future Appointments         Provider Department Appt Notes    In 2 months Desmond Morris MD [facility] Hypertension  follow up.          Recent Outpatient Visits              2 months ago Essential hypertension    [place], Desmond Wilkerson MD    Office Visit    2 months ago Essential hypertension    [facility]  [facility], [facility], Desmond Wilkerson MD    Office Visit    4 months ago Essential hypertension    [facility], [address], Desmond Wilkerson MD    Office Visit    10 months ago Essential hypertension    [facility], [address], Desmond Wilkerson MD    Office Visit    11 months ago Well adult exam    [facility], [facility], Brina Watters APRN    Office Visit

## 2024-06-03 ENCOUNTER — TELEPHONE (OUTPATIENT)
Dept: FAMILY MEDICINE CLINIC | Facility: CLINIC | Age: 59
End: 2024-06-03

## 2024-06-03 NOTE — TELEPHONE ENCOUNTER
I received a call from patient wife Stella and she stated that they do not want to give patient a refill for amlodipine saying it was to early to refill.  After a reviewing wife stated that patient needed Chlorthalidone 25 mg Tab not amlodipine.  Inform wife patient should have refills on file for this. She verbalized understanding. No further action is needed.        chlorthalidone 25 MG Oral Tab 90 tablet 3 6/1/2024 --    Sig - Route: Take 1 tablet (25 mg total) by mouth daily. - Oral    Sent to pharmacy as: Chlorthalidone 25 MG Oral Tablet ([place])    E-Prescribing Status: Receipt confirmed by pharmacy (6/1/2024 10:12 AM CDT)      Pharmacy    [id number] [place], [place] - [address] AT [facility], [phone], [phone]

## 2024-07-03 RX ORDER — OLMESARTAN MEDOXOMIL 40 MG/1
40 TABLET ORAL DAILY
Qty: 90 TABLET | Refills: 3 | Status: SHIPPED | OUTPATIENT
Start: 2024-07-03

## 2024-07-03 NOTE — TELEPHONE ENCOUNTER
REFILL PASSED PER [facility] PROTOCOLS    Requested Prescriptions   Pending Prescriptions Disp Refills    Olmesartan Medoxomil 40 MG Oral Tab 30 tablet 3     Sig: Take 1 tablet (40 mg total) by mouth daily.       Hypertension Medications Protocol Passed - 6/30/2024 12:52 PM        Passed - CMP or BMP in past 12 months        Passed - Last BP reading less than 140/90     BP Readings from Last 1 Encounters:   04/01/24 109/69               Passed - In person appointment or virtual visit in the past 12 mos or appointment in next 3 mos     Recent Outpatient Visits              3 months ago Essential hypertension    [facility], [address], Desmond Wilkerson MD    Office Visit    4 months ago Essential hypertension    [facility], Desmond Wilkerson MD    Office Visit    5 months ago [facility]    [facility]    11 months ago Essential hypertension    [facility], Desmond Wilkerson MD    Office Visit    12 months ago Well adult exam    [facility] Brina Serrano APRN    Office Visit          Future Appointments         Provider Department Appt Notes    In 4 weeks Desmond Morris MD [facility] Hypertension  follow up.                    Passed - EGFRCR or GFRNAA > 50     GFR Evaluation  EGFRCR: 103 , resulted on 7/6/2023               Future Appointments         Provider Department Appt Notes    In 4 weeks Desmond Morris MD [facility] Hypertension  follow up.          Recent Outpatient Visits              3 months ago Essential hypertension    [facility], Desmond Wilkerson MD    Office Visit    4 months ago Essential hypertension    [facility], [facility], Desmond Wilkerson MD    Office Visit    5 months ago Essential hypertension    [facility], [address], Desmond Wilkerson MD    Office Visit    11 months ago Essential hypertension    [facility], [address], Desmond Wilkerson MD    Office Visit    12 months ago Well adult exam    [facility], [facility], Brina Watters APRN    Office Visit

## 2024-08-05 ENCOUNTER — OFFICE VISIT (OUTPATIENT)
Dept: FAMILY MEDICINE CLINIC | Facility: CLINIC | Age: 59
End: 2024-08-05
Payer: COMMERCIAL

## 2024-08-05 VITALS
SYSTOLIC BLOOD PRESSURE: 118 MMHG | BODY MASS INDEX: 34 KG/M2 | DIASTOLIC BLOOD PRESSURE: 78 MMHG | WEIGHT: 209 LBS | HEART RATE: 98 BPM

## 2024-08-05 DIAGNOSIS — Z00.00 PHYSICAL EXAM: Primary | ICD-10-CM

## 2024-08-05 NOTE — PROGRESS NOTES
2024  1:28 PM    Nj Holm is a 59 year old male.    Chief complaint(s):   Chief Complaint   Patient presents with    Routine Physical     HPI:     Nj Holm primary complaint is regarding CPE.     Nj Holm is a 59 year old male is here for routine periodic health screening and examination.  His last physical exam was 2 years ago.  His last ECG was 2 years ago and was normal. His last diabetes screening test was 2 years ago and was normal.   His last cholesterol test was 2 year ago and was normal.  Last dentist visit was many months ago.  He is not current with his Td immunization, 2019. Patient last colonoscopy was 2019, polyps. None smoker.         HISTORY:  Past Medical History:    Colon polyps    repeat CLN in 7 years    High blood pressure    Prediabetes      Past Surgical History:   Procedure Laterality Date    Colonoscopy N/A 10/8/2019    Procedure: COLONOSCOPY;  Surgeon: MARTHA Valles MD;  Location: [facility] ENDOSCOPY    Other Left     Wrist surgery die accidental laceration      Family History   Problem Relation Age of Onset    Hypertension Mother     Cancer Sister         unknown cancer      Social History:   Social History     Socioeconomic History    Marital status:    Tobacco Use    Smoking status: Former     Current packs/day: 0.00     Types: Cigarettes     Start date: 3/20/1977     Quit date: 3/20/1985     Years since quittin.4     Passive exposure: Past    Smokeless tobacco: Never    Tobacco comments:     exposure to cigarettes by roomate   Vaping Use    Vaping status: Never Used   Substance and Sexual Activity    Alcohol use: Yes     Alcohol/week: 5.0 standard drinks of alcohol     Types: 5 Cans of beer per week    Drug use: No     Comment: mariguana (15 years old); quit (19 years old)    Sexual activity: Yes        Immunizations:   Immunization History   Administered Date(s) Administered    Covid-19 Vaccine Pfizer 30 mcg/0.3 ml 2021, 2021    TDAP  03/20/2019    Zoster Vaccine Recombinant Adjuvanted (Shingrix) 07/03/2023, 04/01/2024       Medications (Active prior to today's visit):  Current Outpatient Medications   Medication Sig Dispense Refill    Olmesartan Medoxomil 40 MG Oral Tab Take 1 tablet (40 mg total) by mouth daily. 90 tablet 3    chlorthalidone 25 MG Oral Tab Take 1 tablet (25 mg total) by mouth daily. 90 tablet 3    amLODIPine 5 MG Oral Tab Take 1 tablet (5 mg total) by mouth daily. 90 tablet 3    ergocalciferol 1.25 MG (63816 UT) Oral Cap Take 1 capsule (50,000 Units total) by mouth once a week.      ketoconazole 2 % External Cream Apply to affected area BID. 60 g 1    triamcinolone acetonide 0.1 % External Cream Apply topically 2 (two) times daily as needed. 60 g 1       Allergies:  No Known Allergies      ROS:   Review of Systems   Constitutional:  Negative for appetite change, fatigue and fever.   HENT:  Negative for hearing loss and nosebleeds.    Eyes:  Negative for pain and visual disturbance.   Respiratory:  Negative for apnea and shortness of breath.    Cardiovascular:  Negative for chest pain, palpitations and leg swelling.   Gastrointestinal:  Negative for abdominal pain, blood in stool, constipation, diarrhea, nausea and vomiting.   Endocrine: Negative for polydipsia and polyuria.   Genitourinary:  Negative for decreased urine volume, frequency and hematuria.        No nocturia   Musculoskeletal:  Negative for arthralgias.   Skin:  Negative for rash.   Neurological:  Negative for dizziness, syncope and headaches.   Psychiatric/Behavioral:  Negative for dysphoric mood and sleep disturbance.        PHYSICAL EXAM:   VS: /78   Pulse 98   Wt 209 lb (94.8 kg)   BMI 33.73 kg/m²     Physical Exam  Vitals reviewed.   Constitutional:       Appearance: Normal appearance.      Comments:      HENT:      Head: Normocephalic.      Right Ear: Hearing, tympanic membrane and ear canal normal.      Left Ear: Hearing, tympanic membrane and ear  canal normal.      Nose: Nose normal. No rhinorrhea.      Mouth/Throat:      Mouth: Mucous membranes are moist.      Pharynx: Oropharynx is clear.   Eyes:      Extraocular Movements: Extraocular movements intact.      Conjunctiva/sclera: Conjunctivae normal.      Pupils: Pupils are equal, round, and reactive to light.   Neck:      Thyroid: No thyroid mass.      Vascular: No carotid bruit.   Cardiovascular:      Rate and Rhythm: Normal rate and regular rhythm.      Heart sounds: Normal heart sounds, S1 normal and S2 normal. No murmur heard.  Pulmonary:      Effort: Pulmonary effort is normal.      Breath sounds: Normal breath sounds.   Abdominal:      General: Abdomen is flat. Bowel sounds are normal.      Palpations: Abdomen is soft. There is no hepatomegaly, splenomegaly or mass.      Tenderness: There is no abdominal tenderness.      Hernia: No hernia is present.   Musculoskeletal:      Cervical back: Neck supple.      Comments: Spinal exam without scoliosis.      Lymphadenopathy:      Cervical: No cervical adenopathy.   Skin:     General: Skin is warm.      Findings: No rash.   Neurological:      General: No focal deficit present.      Mental Status: He is alert.      Deep Tendon Reflexes:      Reflex Scores:       Patellar reflexes are 2+ on the right side and 2+ on the left side.  Psychiatric:         Attention and Perception: Attention normal.         Mood and Affect: Mood and affect normal.         LABORATORY RESULTS:   No results found for: \"URCOLOR\", \"URCLA\", \"URINELEUK\", \"URINENITRITE\", \"URINEBLOOD\"   Results for orders placed or performed in visit on 07/16/24   RPR W/Conf [78156][Q][CB]   Result Value Ref Range    RPR (DX) W/REFL TITER AND$CONFIRMATORY TESTING NON-REACTIVE NON-REACTIVE   Quantiferon TB Gold Plus (1Tube) [04060][Q][CB]   Result Value Ref Range    QUANTIFERON(R)-TB GOLD PLUS, 1 TUBE NEGATIVE NEGATIVE    NIL 0.08 IU/mL    MITOGEN-NIL >10.00 IU/mL    TB1-NIL 0.02 IU/mL    TB2-NIL 0.02 IU/mL    Rubeola Antibodies, IGG [964] [Q][CB]   Result Value Ref Range    MEASLES ANTIBODY (IGG) >300.00 AU/mL   Mumps Antibodies, IGG [8624] [Q][CB]   Result Value Ref Range    MUMPS VIRUS$ANTIBODY (IGG) <9.00 (L) AU/mL   HEP B SURFACE AB QN [8475] [Q] [CB]   Result Value Ref Range    HEPATITIS B SURFACE$ANTIBODY (QUANT) TNP mIU/mL   Rubella, IGG [E][CB]   Result Value Ref Range    RUBELLA ANTIBODY (IGG) 18.60 Index   Varicella Zoster, IGG [4439] [Q][CB]   Result Value Ref Range    VARICELLA ZOSTER VIRUS$AB (IGG) >4000.00 index   HEP B SURFACE AB QN   Result Value Ref Range    HEPATITIS B SURFACE$ANTIBODY (QUANT) <5 (L) > OR = 10 mIU/mL   TEST AUTHORIZATION   Result Value Ref Range    TEST(S) ORDERED ON$REQUISITION  HEPATITIS B SURFACE AB     TEST CODE:  8475SB     CLIENT CONTACT: LUIS HUGO     REPORT ALWAYS MESSAGE$SIGNATURE      COMMENT     Neisseria Gonorrhoeae RNA TMA Urogenital [50271][Q][CB]    Specimen: Urethra   Result Value Ref Range    NEISSERIA GONORRHOEAE$RNA, TMA NOT DETECTED NOT DETECTED    .          EKG / Spirometry : -     Radiology: No results found.     ASSESSMENT/PLAN:   Assessment   Encounter Diagnosis   Name Primary?    Physical exam Yes         CPE PLAN:    LABS / TEST & ORDERS for today's visit :Blood test(s) ordered today for send out to [facility] [place] lab:  Orders Placed This Encounter   Procedures    CBC With Differential With Platelet    Comp Metabolic Panel (14)    Hemoglobin A1C    Lipid Panel    PSA, Total W Reflex To Free    TSH W Reflex To Free T4    Vitamin D    Urinalysis with Culture Reflex   In-House; Urine dip.  Test/Procedures done today include: EKG.    IMMUNIZATIONS: none given today.    RECOMMENDATIONS given include: ANTICIPATORY GUIDANCE  topics covered today include: safety (i.e. seat belts, helmets, sunscreen, protective sports gear ), nutrition (i.e. healthy meals and snacks (i.e. avoid junk food and high-carbohydrate foods); athletic conditioning, fluids; low fat milk,  limit to less than 20 oz. a day; dental care ), and Healthy habits& Social competence & Responsibilities: Recommendations on physical activity; exercise daily or at least 3 times a week for 30-60 minutes doing cardiovascular exercise. Encourage to maintain the best physical and dental hygiene possible.  Consider a  if overweight and/or having difficult in staying active; attempt to keep a schedule that includes an adequate sleep and physical exercise / activities Patient educated on doing regular self testicular exam. Patient was educated on sexual transmitted disease. Best to abstain from sexual intercourse until he is ready to form a family. Use of condoms may prevent transmission of infections as well as pregnancy.    FOLLOW-UP: Schedule a follow-up visit in 12 months.          Orders This Visit:  Orders Placed This Encounter   Procedures    CBC With Differential With Platelet    Comp Metabolic Panel (14)    Hemoglobin A1C    Lipid Panel    PSA, Total W Reflex To Free    TSH W Reflex To Free T4    Vitamin D    Urinalysis with Culture Reflex       Meds This Visit:  Requested Prescriptions      No prescriptions requested or ordered in this encounter       Imaging & Referrals:  None         ASTRID SILVERIO MD

## 2024-09-25 RX ORDER — ERGOCALCIFEROL 1.25 MG/1
50000 CAPSULE, LIQUID FILLED ORAL WEEKLY
Qty: 12 CAPSULE | Refills: 0 | Status: SHIPPED | OUTPATIENT
Start: 2024-09-25

## 2024-09-25 NOTE — TELEPHONE ENCOUNTER
Please review. Protocol Failed; No Protocol    Medication(s) to Refill:   Requested Prescriptions     Pending Prescriptions Disp Refills    ERGOCALCIFEROL 1.25 MG (35630 UT) Oral Cap [Pharmacy Med Name: VITAMIN D2 50,000IU (ERGO) CAP RX] 12 capsule 0     Sig: TAKE 1 CAPSULE BY MOUTH 1 TIME A WEEK         Reason for Medication Refill being sent to Provider / Reason Protocol Failed:  [x] Non-Protocol Medication    Message sent to patient to complete labs     Recent Labs:  Lab Results   Component Value Date    VITD 24.1 (L) 07/06/2023              Requested Prescriptions   Pending Prescriptions Disp Refills    ERGOCALCIFEROL 1.25 MG (58168 UT) Oral Cap [Pharmacy Med Name: VITAMIN D2 50,000IU (ERGO) CAP RX] 12 capsule 0     Sig: TAKE 1 CAPSULE BY MOUTH 1 TIME A WEEK       There is no refill protocol information for this order              Recent Outpatient Visits              1 month ago Physical exam    [facility], Desmond Wilkerson MD    Office Visit    2 months ago History and physical examination, immigration    [facility], Audi Schaffer MD    Office Visit    5 months ago Essential hypertension    [facility], Desmond Wilkerson MD    Office Visit    6 months ago Essential hypertension    [facility], Desmond Wilkerson MD    Office Visit    7 months ago Essential hypertension    [facility], Desmond Wilkerson MD    Office Visit

## 2024-12-19 RX ORDER — ERGOCALCIFEROL 1.25 MG/1
50000 CAPSULE, LIQUID FILLED ORAL WEEKLY
Qty: 12 CAPSULE | Refills: 0 | Status: SHIPPED | OUTPATIENT
Start: 2024-12-19

## 2024-12-19 NOTE — TELEPHONE ENCOUNTER
Please review. Protocol Failed; No Protocol    Medication(s) to Refill:   Requested Prescriptions     Pending Prescriptions Disp Refills    ERGOCALCIFEROL 1.25 MG (00357 UT) Oral Cap [Pharmacy Med Name: VITAMIN D2 50,000IU (ERGO) CAP RX] 12 capsule 0     Sig: TAKE 1 CAPSULE BY MOUTH 1 TIME A WEEK         Reason for Medication Refill being sent to Provider / Reason Protocol Failed:  [x] Non-Protocol Medication        Recent Labs:  Lab Results   Component Value Date    VITD 24.1 (L) 07/06/2023              Requested Prescriptions   Pending Prescriptions Disp Refills    ERGOCALCIFEROL 1.25 MG (95140 UT) Oral Cap [Pharmacy Med Name: VITAMIN D2 50,000IU (ERGO) CAP RX] 12 capsule 0     Sig: TAKE 1 CAPSULE BY MOUTH 1 TIME A WEEK       There is no refill protocol information for this order              Recent Outpatient Visits              4 months ago Physical exam    [facility], Desmond Wilkerson MD    Office Visit    5 months ago History and physical examination, immigration    [facility], Audi Schaffer MD    Office Visit    8 months ago Essential hypertension    [facility], Desmond Wilkerson MD    Office Visit    9 months ago Essential hypertension    [facility], Desmond Wilkerson MD    Office Visit    10 months ago Essential hypertension    [facility], Desmond Wilkerson MD    Office Visit

## 2025-03-12 ENCOUNTER — OFFICE VISIT (OUTPATIENT)
Dept: FAMILY MEDICINE CLINIC | Facility: CLINIC | Age: 60
End: 2025-03-12

## 2025-03-12 VITALS
SYSTOLIC BLOOD PRESSURE: 126 MMHG | BODY MASS INDEX: 33.59 KG/M2 | TEMPERATURE: 97 F | DIASTOLIC BLOOD PRESSURE: 77 MMHG | HEART RATE: 80 BPM | WEIGHT: 209 LBS | HEIGHT: 66 IN

## 2025-03-12 DIAGNOSIS — G47.33 OBSTRUCTIVE SLEEP APNEA SYNDROME: ICD-10-CM

## 2025-03-12 DIAGNOSIS — E55.9 VITAMIN D DEFICIENCY: ICD-10-CM

## 2025-03-12 DIAGNOSIS — E66.09 CLASS 1 OBESITY DUE TO EXCESS CALORIES WITH SERIOUS COMORBIDITY AND BODY MASS INDEX (BMI) OF 33.0 TO 33.9 IN ADULT: ICD-10-CM

## 2025-03-12 DIAGNOSIS — E66.811 CLASS 1 OBESITY DUE TO EXCESS CALORIES WITH SERIOUS COMORBIDITY AND BODY MASS INDEX (BMI) OF 33.0 TO 33.9 IN ADULT: ICD-10-CM

## 2025-03-12 DIAGNOSIS — R06.83 SNORING: Primary | ICD-10-CM

## 2025-03-12 PROCEDURE — 99213 OFFICE O/P EST LOW 20 MIN: CPT | Performed by: FAMILY MEDICINE

## 2025-03-12 PROCEDURE — 3008F BODY MASS INDEX DOCD: CPT | Performed by: FAMILY MEDICINE

## 2025-03-12 PROCEDURE — 3078F DIAST BP <80 MM HG: CPT | Performed by: FAMILY MEDICINE

## 2025-03-12 PROCEDURE — 3074F SYST BP LT 130 MM HG: CPT | Performed by: FAMILY MEDICINE

## 2025-03-12 RX ORDER — ERGOCALCIFEROL 1.25 MG/1
CAPSULE, LIQUID FILLED ORAL
Qty: 12 CAPSULE | Refills: 1 | Status: SHIPPED | OUTPATIENT
Start: 2025-03-12

## 2025-03-12 NOTE — PROGRESS NOTES
3/12/2025  1:39 PM    Nj Holm is a 60 year old male.    Chief complaint(s):   Chief Complaint   Patient presents with    Snoring     Patient is coming in for snoring     HPI:     Nj Holm primary complaint is regarding as above.     Patient is a 60-year-old male who presents accompanied with the wife complaining of snoring and having episodes of apnea during the night.  Patient denies any problems sleeping, waking up in the morning well rested and being sleepy during the day.    In addition he is requesting refills of medication vitamin D.      HISTORY:  Past Medical History:    Colon polyps    repeat CLN in 7 years    High blood pressure    Prediabetes      Past Surgical History:   Procedure Laterality Date    Colonoscopy N/A 10/8/2019    Procedure: COLONOSCOPY;  Surgeon: MARTHA Valles MD;  Location: [facility] ENDOSCOPY    Other Left     Wrist surgery die accidental laceration      Family History   Problem Relation Age of Onset    Hypertension Mother     Cancer Sister         unknown cancer      Social History:   Social History     Socioeconomic History    Marital status:    Tobacco Use    Smoking status: Former     Current packs/day: 0.00     Types: Cigarettes     Start date: 3/20/1977     Quit date: 3/20/1985     Years since quittin.0     Passive exposure: Past    Smokeless tobacco: Never    Tobacco comments:     exposure to cigarettes by roomate   Vaping Use    Vaping status: Never Used   Substance and Sexual Activity    Alcohol use: Yes     Alcohol/week: 5.0 standard drinks of alcohol     Types: 5 Cans of beer per week    Drug use: No     Comment: mariguana (15 years old); quit (19 years old)    Sexual activity: Yes        Immunizations:   Immunization History   Administered Date(s) Administered    Covid-19 Vaccine Pfizer 30 mcg/0.3 ml 2021, 2021    TDAP 2019    Zoster Vaccine Recombinant Adjuvanted (Shingrix) 2023, 2024       Medications (Active prior to  Bedside shift report was given to Bill Carson. today's visit):  Current Outpatient Medications   Medication Sig Dispense Refill    ergocalciferol 1.25 MG (89324 UT) Oral Cap Take 1 tab po Q week 12 capsule 1    ergocalciferol 1.25 MG (86806 UT) Oral Cap Take 1 capsule (50,000 Units total) by mouth once a week. 12 capsule 0    Olmesartan Medoxomil 40 MG Oral Tab Take 1 tablet (40 mg total) by mouth daily. 90 tablet 3    chlorthalidone 25 MG Oral Tab Take 1 tablet (25 mg total) by mouth daily. 90 tablet 3    amLODIPine 5 MG Oral Tab Take 1 tablet (5 mg total) by mouth daily. 90 tablet 3    ketoconazole 2 % External Cream Apply to affected area BID. 60 g 1    triamcinolone acetonide 0.1 % External Cream Apply topically 2 (two) times daily as needed. 60 g 1       Allergies:  Allergies[1]      ROS:   Review of Systems   Constitutional:  Positive for unexpected weight change (obese). Negative for appetite change, fatigue and fever.   Respiratory:  Positive for apnea. Negative for shortness of breath.    Cardiovascular:  Negative for chest pain.   Gastrointestinal:  Negative for abdominal pain.   Musculoskeletal:  Negative for myalgias.   Skin:  Negative for rash.   Neurological:  Negative for dizziness, weakness and headaches.       PHYSICAL EXAM:   VS: /77 (BP Location: Right arm, Patient Position: Sitting, Cuff Size: adult)   Pulse 80   Temp 97 °F (36.1 °C)   Ht 5' 6\" (1.676 m)   Wt 209 lb (94.8 kg)   BMI 33.73 kg/m²     Physical Exam  Vitals reviewed.   Constitutional:       Appearance: Normal appearance. He is well-developed.   HENT:      Head: Normocephalic.   Eyes:      General: No scleral icterus.     Conjunctiva/sclera: Conjunctivae normal.   Cardiovascular:      Rate and Rhythm: Normal rate.   Pulmonary:      Effort: Pulmonary effort is normal.   Musculoskeletal:      Cervical back: Neck supple.   Skin:     Findings: No rash.   Psychiatric:         Mood and Affect: Mood normal.         LABORATORY RESULTS:       EKG / Spirometry : -     Radiology:  No results found.     ASSESSMENT/PLAN:   Assessment   Encounter Diagnoses   Name Primary?    Snoring Yes    Obstructive sleep apnea syndrome     Class 1 obesity due to excess calories with serious comorbidity and body mass index (BMI) of 33.0 to 33.9 in adult     Vitamin D deficiency        MEDICATIONS:     Requested Prescriptions     Signed Prescriptions Disp Refills    ergocalciferol 1.25 MG (82299 UT) Oral Cap 12 capsule 1     Sig: Take 1 tab po Q week     REFERRALS: [facility] REFERRAL HOME SLEEP APNEA TEST,       Procedures    Home Sleep Apnea Test (Adult pt only) - Sleep consult required for Medicare pts        RECOMMENDATIONS given include: Patient was reassured of  his medical condition and all questions and concerns were answered. Patient was informed to please, call our office with any new or further questions or concerns that may come up in the near future. Notify Dr Silverio or the [facility] if there is a deterioration or worsening of the medical condition. Also, inform the doctor with any new symptoms or medications' side effects.  Weight loss plan.     FOLLOW-UP: Schedule a follow-up visit in  [facility] for CPE.            Orders This Visit:  No orders of the defined types were placed in this encounter.      Meds This Visit:  Requested Prescriptions     Signed Prescriptions Disp Refills    ergocalciferol 1.25 MG (67281 UT) Oral Cap 12 capsule 1     Sig: Take 1 tab po Q week       Imaging & Referrals:  OP [facility] ALT REFERRAL HOME SLEEP APNEA TEST         ASTRID SILVERIO MD       [1] No Known Allergies

## 2025-04-15 RX ORDER — AMLODIPINE BESYLATE 5 MG/1
5 TABLET ORAL DAILY
Qty: 90 TABLET | Refills: 3 | Status: SHIPPED | OUTPATIENT
Start: 2025-04-15

## 2025-05-01 ENCOUNTER — OFFICE VISIT (OUTPATIENT)
Dept: SLEEP CENTER | Age: 60
End: 2025-05-01
Attending: FAMILY MEDICINE
Payer: COMMERCIAL

## 2025-05-01 DIAGNOSIS — G47.33 OBSTRUCTIVE SLEEP APNEA SYNDROME: ICD-10-CM

## 2025-05-01 DIAGNOSIS — R06.83 SNORING: ICD-10-CM

## 2025-05-01 PROCEDURE — 95806 SLEEP STUDY UNATT&RESP EFFT: CPT

## 2025-05-02 ENCOUNTER — TELEPHONE (OUTPATIENT)
Dept: SLEEP CENTER | Age: 60
End: 2025-05-02

## 2025-05-04 DIAGNOSIS — G47.33 OBSTRUCTIVE SLEEP APNEA SYNDROME: Primary | ICD-10-CM

## 2025-05-04 NOTE — PROCEDURES
[facility]  Accredited by the American Academy of Sleep Medicine (AASM)    PATIENT'S NAME: RITU ROSAS   ATTENDING PHYSICIAN: Desmond Morris MD   REFERRING PHYSICIAN: Desmond Morris MD   PATIENT ACCOUNT #: [id number] LOCATION: Sleep Center   MEDICAL RECORD #: [id number] YOB: 1965   DATE OF STUDY: 05/02/2025       SLEEP STUDY REPORT    STUDY TYPE:  Home sleep test.    INDICATION:  Suspected obstructive sleep apnea (ICD-10 code G47.33) in a patient with snoring, witnessed apneic events, daytime somnolence, nocturnal awakenings, body mass index 34, and an Trinity Sleepiness Scale score of 7/24.    RESULTS:  The patient underwent home sleep test with measurement of his nasal airflow, nasal air pressure, snoring, chest and abdominal wall motion, oximetry, and body position.  I have reviewed the entirety of the raw data of this study.  During this study, total recording time is 438 minutes.  The lights-out clock time is 3:32 a.m., the lights-on clock time is 10:50 a.m.  The apnea plus hypopnea index is 44.2 events per hour, rising to 81 per hour in the supine position.  The average oxygen saturation is 94%, the lowest oxygen saturation is 82%, and the patient spent 3.4% of the test with saturations 88% or less.  The average heart rate is 69 beats per minute and the patient spent 10% of the test with saturations 88% or less.  The average heart rate is 68 beats per minute and the patient spent 10% of the test in the supine position.    INTERPRETATION:  The data generated from this study is consistent with severe obstructive sleep apnea which is worse in the supine position (ICD-10 code G47.33).    RECOMMENDATIONS:  1.   CPAP titration.  2.   Weight loss.  3.   Avoid alcohol.  4.   Avoid sedating drug.  5.   Patient should not drive if at all sleepy.    Please do not hesitate to contact me if there is any question whatsoever regarding interpretation of this study.    Dictated By  Efe Schmid MD  d: 05/03/2025 19:12:19  t: 05/03/2025 20:22:02  [facility] [id number]  [facility]/    cc: Desmond Morris MD

## 2025-05-05 ENCOUNTER — PATIENT MESSAGE (OUTPATIENT)
Age: 60
End: 2025-05-05

## 2025-05-05 NOTE — TELEPHONE ENCOUNTER
Patient  wife  Stella calling in regards to Titration sleep study ( name and date of birth of patient verified wife   on Release of Information )        Provided information to call Central scheduling at [phone] to make the appointment     Patient  wife verbalizes understanding and agrees with plan. '

## 2025-05-12 ENCOUNTER — TELEPHONE (OUTPATIENT)
Dept: SLEEP CENTER | Age: 60
End: 2025-05-12

## 2025-05-22 ENCOUNTER — OFFICE VISIT (OUTPATIENT)
Dept: FAMILY MEDICINE CLINIC | Facility: CLINIC | Age: 60
End: 2025-05-22
Payer: COMMERCIAL

## 2025-05-22 VITALS
WEIGHT: 219 LBS | HEIGHT: 66 IN | HEART RATE: 80 BPM | BODY MASS INDEX: 35.2 KG/M2 | SYSTOLIC BLOOD PRESSURE: 115 MMHG | DIASTOLIC BLOOD PRESSURE: 66 MMHG

## 2025-05-22 DIAGNOSIS — E66.01 CLASS 2 SEVERE OBESITY DUE TO EXCESS CALORIES WITH SERIOUS COMORBIDITY AND BODY MASS INDEX (BMI) OF 35.0 TO 35.9 IN ADULT (HCC): ICD-10-CM

## 2025-05-22 DIAGNOSIS — E66.812 CLASS 2 SEVERE OBESITY DUE TO EXCESS CALORIES WITH SERIOUS COMORBIDITY AND BODY MASS INDEX (BMI) OF 35.0 TO 35.9 IN ADULT (HCC): ICD-10-CM

## 2025-05-22 DIAGNOSIS — G47.33 OBSTRUCTIVE SLEEP APNEA SYNDROME: Primary | ICD-10-CM

## 2025-05-22 PROCEDURE — 3074F SYST BP LT 130 MM HG: CPT | Performed by: FAMILY MEDICINE

## 2025-05-22 PROCEDURE — 99214 OFFICE O/P EST MOD 30 MIN: CPT | Performed by: FAMILY MEDICINE

## 2025-05-22 PROCEDURE — 3078F DIAST BP <80 MM HG: CPT | Performed by: FAMILY MEDICINE

## 2025-05-22 PROCEDURE — 3008F BODY MASS INDEX DOCD: CPT | Performed by: FAMILY MEDICINE

## 2025-05-22 NOTE — PROGRESS NOTES
5/22/2025  1:20 PM    Nj Holm is a 60 year old male.    Chief complaint(s):   Chief Complaint   Patient presents with    Obstructive Sleep Apnea (ESTELITA)     F/u after sleep study, patient was to complete titration but insurance denied      HPI:     Nj Holm primary complaint is regarding ESTELITA.     Patient is a 61-year-old male who recently underwent a sleep study and confirmed to have sleep apnea.  Pulmonology recommend that he undergoes a titration study but insurance is declining refusing to authorize the titration study and Auto-CPAP machine.  He is obese and overweight and is agreeable to also start GLP-1 to help him with some weight.  No history of diabetes.      HISTORY:  Past Medical History[1]   Past Surgical History[2]   Family History[3]   Social History: Short Social Hx on File[4]     Immunizations:   Immunization History   Administered Date(s) Administered    Covid-19 Vaccine Pfizer 30 mcg/0.3 ml 03/16/2021, 04/06/2021    TDAP 03/20/2019    Zoster Vaccine Recombinant Adjuvanted (Shingrix) 07/03/2023, 04/01/2024       Medications (Active prior to today's visit):  Current Medications[5]    Allergies:  Allergies[6]      ROS:   Review of Systems   Constitutional:  Positive for unexpected weight change. Negative for appetite change, fatigue and fever.   Respiratory:  Positive for apnea. Negative for shortness of breath.    Cardiovascular:  Negative for chest pain.   Gastrointestinal:  Negative for abdominal pain.   Endocrine: Negative for polydipsia and polyuria.   Musculoskeletal:  Negative for myalgias.   Skin:  Negative for rash.   Neurological:  Negative for dizziness, weakness and headaches.       PHYSICAL EXAM:   VS: /66 (BP Location: Right arm, Patient Position: Sitting, Cuff Size: adult)   Pulse 80   Ht 5' 6\" (1.676 m)   Wt 219 lb (99.3 kg)   BMI 35.35 kg/m²     Physical Exam  Vitals reviewed.   Constitutional:       Appearance: Normal appearance. He is well-developed. He is  obese.   HENT:      Head: Normocephalic.   Eyes:      General: No scleral icterus.     Conjunctiva/sclera: Conjunctivae normal.   Cardiovascular:      Rate and Rhythm: Normal rate.   Pulmonary:      Effort: Pulmonary effort is normal.   Musculoskeletal:      Cervical back: Neck supple.   Skin:     Findings: No rash.   Psychiatric:         Mood and Affect: Mood normal.         LABORATORY RESULTS:     EKG / Spirometry : -     Radiology: No results found.     ASSESSMENT/PLAN:   Assessment   Encounter Diagnoses   Name Primary?    Obstructive sleep apnea syndrome Yes    Class 2 severe obesity due to excess calories with serious comorbidity and body mass index (BMI) of 35.0 to 35.9 in adult (HCC)          MEDICATIONS:   Requested Prescriptions     Signed Prescriptions Disp Refills    semaglutide-weight management 0.25 MG/0.5ML Subcutaneous Solution Auto-injector 2 mL 1     Si.25 mg Q weeks for 4 weeks, then 0.5 mg Q week     REFERRALS: PULMONARY - EXTERNAL   RECOMMENDATIONS given include: Please, call our office with any questions or concerns. Notify Dr Morris or the [facility] if there is a development of any new medical condition. Stop medication immediatley if she believes or becomes pregnant. Also, inform the doctor with any new symptoms or medications' side effects. Patient was informed to follow a low carbohydrate, low calories diet and to maintain a  Cardiovascular exercise for 60 minutes 3-5 times a week. Consider a  if experience difficult keep track with exercise or staying at task.  Attempt to keep a schedule that includes an adequate sleep-work-physical exercise balance. Advised to increase water intake especially just before meals. Patient was educated that this will not be a temporary change, but a life time, life style change.    FOLLOW-UP: Schedule a follow-up visit in  prn.             Orders This Visit:  No orders of the defined types were placed in this encounter.      Meds  This Visit:  Requested Prescriptions     Signed Prescriptions Disp Refills    semaglutide-weight management 0.25 MG/0.5ML Subcutaneous Solution Auto-injector 2 mL 1     Si.25 mg Q weeks for 4 weeks, then 0.5 mg Q week       Imaging & Referrals:  PULMONARY - EXTERNAL         ASTRID SILVERIO MD         [1]   Past Medical History:   Colon polyps    repeat CLN in 7 years    High blood pressure    Prediabetes   [2]   Past Surgical History:  Procedure Laterality Date    Colonoscopy N/A 10/8/2019    Procedure: COLONOSCOPY;  Surgeon: MARTHA Valles MD;  Location: [facility] ENDOSCOPY    Other Left     Wrist surgery die accidental laceration   [3]   Family History  Problem Relation Age of Onset    Hypertension Mother     Cancer Sister         unknown cancer   [4]   Social History  Socioeconomic History    Marital status:    Tobacco Use    Smoking status: Former     Current packs/day: 0.00     Types: Cigarettes     Start date: 3/20/1977     Quit date: 3/20/1985     Years since quittin.2     Passive exposure: Past    Smokeless tobacco: Never    Tobacco comments:     exposure to cigarettes by roomate   Vaping Use    Vaping status: Never Used   Substance and Sexual Activity    Alcohol use: Yes     Alcohol/week: 5.0 standard drinks of alcohol     Types: 5 Cans of beer per week    Drug use: No     Comment: mariguana (15 years old); quit (19 years old)    Sexual activity: Yes   [5]   Current Outpatient Medications   Medication Sig Dispense Refill    semaglutide-weight management 0.25 MG/0.5ML Subcutaneous Solution Auto-injector 0.25 mg Q weeks for 4 weeks, then 0.5 mg Q week 2 mL 1    AMLODIPINE 5 MG Oral Tab TAKE 1 TABLET(5 MG) BY MOUTH DAILY 90 tablet 3    Olmesartan Medoxomil 40 MG Oral Tab Take 1 tablet (40 mg total) by mouth daily. 90 tablet 3    chlorthalidone 25 MG Oral Tab Take 1 tablet (25 mg total) by mouth daily. 90 tablet 3    ketoconazole 2 % External Cream Apply to affected area BID. (Patient not taking:  Reported on 5/22/2025) 60 g 1    triamcinolone acetonide 0.1 % External Cream Apply topically 2 (two) times daily as needed. (Patient not taking: Reported on 5/22/2025) 60 g 1   [6] No Known Allergies

## 2025-05-27 RX ORDER — CHLORTHALIDONE 25 MG/1
25 TABLET ORAL DAILY
Qty: 90 TABLET | Refills: 3 | Status: SHIPPED | OUTPATIENT
Start: 2025-05-27

## 2025-07-01 RX ORDER — OLMESARTAN MEDOXOMIL 40 MG/1
40 TABLET ORAL DAILY
Qty: 90 TABLET | Refills: 3 | Status: SHIPPED | OUTPATIENT
Start: 2025-07-01

## 2025-07-01 NOTE — TELEPHONE ENCOUNTER
For replies, please route to pool: [facility] CENTRAL REFILLS    Please review: medication fails/has no protocol attached.    Future Appointments   Date Time Provider Department Center   8/25/2025  1:00 PM Desmond Morris MD Counts include 234 beds at the [facility]     No active/future labs pended - last completed - 7/6/2023

## 2025-08-25 ENCOUNTER — OFFICE VISIT (OUTPATIENT)
Dept: FAMILY MEDICINE CLINIC | Facility: CLINIC | Age: 60
End: 2025-08-25

## 2025-08-25 VITALS
HEART RATE: 84 BPM | WEIGHT: 220 LBS | DIASTOLIC BLOOD PRESSURE: 80 MMHG | BODY MASS INDEX: 36 KG/M2 | SYSTOLIC BLOOD PRESSURE: 137 MMHG

## 2025-08-25 DIAGNOSIS — E66.812 CLASS 2 SEVERE OBESITY DUE TO EXCESS CALORIES WITH SERIOUS COMORBIDITY AND BODY MASS INDEX (BMI) OF 35.0 TO 35.9 IN ADULT (HCC): ICD-10-CM

## 2025-08-25 DIAGNOSIS — E66.01 CLASS 2 SEVERE OBESITY DUE TO EXCESS CALORIES WITH SERIOUS COMORBIDITY AND BODY MASS INDEX (BMI) OF 35.0 TO 35.9 IN ADULT (HCC): ICD-10-CM

## 2025-08-25 DIAGNOSIS — G47.33 OBSTRUCTIVE SLEEP APNEA SYNDROME: ICD-10-CM

## 2025-08-25 DIAGNOSIS — Z00.00 PHYSICAL EXAM: Primary | ICD-10-CM

## (undated) DEVICE — LINE MNTR ADLT SET O2 INTMD

## (undated) DEVICE — Device: Brand: CUSTOM PROCEDURE KIT

## (undated) DEVICE — 35 ML SYRINGE REGULAR TIP: Brand: MONOJECT

## (undated) DEVICE — SNARE ENDOSCOPIC 10MM ROUND

## (undated) DEVICE — SNARE OPTMZ PLPCTM TRP

## (undated) DEVICE — MASK PROC W/VISOR ANTIGLARE

## (undated) DEVICE — CLIP LGT 11MM OPEN 2.8MM 235CM

## (undated) DEVICE — MEDI-VAC NON-CONDUCTIVE SUCTION TUBING 6MM X 1.8M (6FT.) L: Brand: CARDINAL HEALTH

## (undated) NOTE — LETTER
8/8/2022    Gideon Santoro        [address] [id number]            Dear Gideon Santoro,      Our records indicate that you are due for an appointment for a Colonoscopy with Yumi Byrd MD. Our doctors are booking out about 3-5 months for procedures. Please call our office to schedule this appointment. Your medical well-being is important to us. If your insurance requires a referral, please call your primary care office to request one.       Thank you,      The Physicians and Staff at [facility]

## (undated) NOTE — LETTER
[place], [facility]  [address]  [facility] [address] [address] [phone]                10/22/19      Gloria Augustine  [address]        Dear Charito Botello,    I reviewed the pathology report fr